# Patient Record
Sex: FEMALE | Race: WHITE | NOT HISPANIC OR LATINO | Employment: FULL TIME | URBAN - METROPOLITAN AREA
[De-identification: names, ages, dates, MRNs, and addresses within clinical notes are randomized per-mention and may not be internally consistent; named-entity substitution may affect disease eponyms.]

---

## 2018-09-26 ENCOUNTER — APPOINTMENT (OUTPATIENT)
Dept: WOMENS IMAGING | Facility: HOSPITAL | Age: 53
End: 2018-09-26

## 2018-09-26 PROCEDURE — 77067 SCR MAMMO BI INCL CAD: CPT | Performed by: RADIOLOGY

## 2020-12-09 ENCOUNTER — APPOINTMENT (OUTPATIENT)
Dept: WOMENS IMAGING | Facility: HOSPITAL | Age: 55
End: 2020-12-09

## 2020-12-09 PROCEDURE — 77067 SCR MAMMO BI INCL CAD: CPT | Performed by: RADIOLOGY

## 2021-03-24 ENCOUNTER — OFFICE VISIT (OUTPATIENT)
Dept: CARDIOLOGY | Facility: CLINIC | Age: 56
End: 2021-03-24

## 2021-03-24 VITALS
OXYGEN SATURATION: 98 % | SYSTOLIC BLOOD PRESSURE: 128 MMHG | RESPIRATION RATE: 12 BRPM | BODY MASS INDEX: 30.55 KG/M2 | HEART RATE: 84 BPM | HEIGHT: 62 IN | DIASTOLIC BLOOD PRESSURE: 74 MMHG | WEIGHT: 166 LBS | TEMPERATURE: 97.8 F

## 2021-03-24 DIAGNOSIS — I73.9 PVD (PERIPHERAL VASCULAR DISEASE) WITH CLAUDICATION (HCC): ICD-10-CM

## 2021-03-24 DIAGNOSIS — R06.02 SHORTNESS OF BREATH: Primary | ICD-10-CM

## 2021-03-24 PROCEDURE — 93000 ELECTROCARDIOGRAM COMPLETE: CPT | Performed by: INTERNAL MEDICINE

## 2021-03-24 PROCEDURE — 99204 OFFICE O/P NEW MOD 45 MIN: CPT | Performed by: INTERNAL MEDICINE

## 2021-03-24 NOTE — PROGRESS NOTES
[facility] [place]  [facility] CARDIOLOGY  [address] [place] [address]  Dept: [phone]  Dept Fax: [phone]    Kalli Lemons  1965    New Patient Office Note    History of Present Illness:  Kalli Lemons is a 56 y.o. female who presents to the clinic for new patient. She is smoker, wants to be proactive and wants to know if she might have heart problems, her cousin just  from [place], this week, she does have some SOB on walking and she thinks is related to smoking, also feels tingling and numbness on her legs and arms, her BP is 125.80, her Lipids are elevated, LDl is 145. , her cardiac exam seems normal, EKG sinus rhythm normal EKG , will get a Plain treadmill and also an echo plus an SVETLANA,     The following portions of the patient's history were reviewed and updated as appropriate: allergies, current medications, past family history, past medical history, past social history, past surgical history and problem list.    Medications:  vitamin B-12  Vitamin D3 tablet    Subjective  No Known Allergies     Past Medical History:   Diagnosis Date   • Acute non-recurrent frontal sinusitis    • Anxiety    • BMI 30.0-30.9,adult    • Cigarette nicotine dependence with nicotine-induced disorder    • Cough    • Depression    • Extremity numbness    • Fatigue    • Irritable bowel syndrome with diarrhea    • Polyuria        Past Surgical History:   Procedure Laterality Date   • CHOLECYSTECTOMY     • HYSTERECTOMY         Family History   Problem Relation Age of Onset   • Cancer Brother         Social History     Socioeconomic History   • Marital status: Unknown     Spouse name: Not on file   • Number of children: Not on file   • Years of education: Not on file   • Highest education level: Not on file   Tobacco Use   • Smoking status: Current Some Day Smoker     Packs/day: 1.00     Types: Cigarettes     Start date:    • Smokeless tobacco: Never Used   Substance and Sexual Activity   •  "Alcohol use: Never   • Drug use: Yes     Types: Marijuana   • Sexual activity: Defer       Review of Systems   Constitutional: Negative.    HENT: Negative.    Respiratory: Positive for shortness of breath.    Cardiovascular: Negative.    Endocrine: Negative.    Genitourinary: Negative.    Musculoskeletal: Negative.    Skin: Negative.    Allergic/Immunologic: Negative.    Neurological: Negative.    Hematological: Negative.    Psychiatric/Behavioral: Negative.        Cardiovascular Procedures    ECHO/MUGA:   STRESS TESTS:   CARDIAC CATH:   DEVICES:   HOLTER:   CT/MRI:   VASCULAR:   CARDIOTHORACIC:     Objective  Vitals:    03/24/21 1523   BP: 128/74   BP Location: Left arm   Patient Position: Sitting   Cuff Size: Adult   Pulse: 84   Resp: 12   Temp: 97.8 °F (36.6 °C)   TempSrc: Infrared   SpO2: 98%   Weight: 75.3 kg (166 lb)   Height: 157.5 cm (62\")   PainSc: 0-No pain       Physical Exam  Constitutional:       Appearance: Healthy appearance. Not in distress.   Neck:      Vascular: No JVR. JVD normal.   Pulmonary:      Effort: Pulmonary effort is normal.      Breath sounds: Normal breath sounds. No wheezing. No rhonchi. No rales.   Chest:      Chest wall: Not tender to palpatation.   Cardiovascular:      PMI at left midclavicular line. Normal rate. Regular rhythm. Normal S1. Normal S2.      Murmurs: There is no murmur.      No gallop. No click. No rub.   Pulses:     Intact distal pulses.   Edema:     Peripheral edema absent.   Abdominal:      General: Bowel sounds are normal.      Palpations: Abdomen is soft.      Tenderness: There is no abdominal tenderness.   Musculoskeletal: Normal range of motion.         General: No tenderness. Skin:     General: Skin is warm and dry.   Neurological:      General: No focal deficit present.      Mental Status: Alert and oriented to person, place and time.          Diagnostic Data    ECG 12 Lead    Date/Time: 3/24/2021 3:58 PM  Performed by: Haseeb Graves MD  Authorized " by: Haseeb Graves MD   Comparison: not compared with previous ECG   Rhythm: sinus rhythm  Rate: normal  BPM: 79  QRS axis: normal    Clinical impression: normal ECG            Assessment and Plan  Shortness of breath-She has sob for a while ., mostly on walking or  doing her chores, she is smoker, will get a treadmill EKG and also an echo  Claudication- He numbness on her legs but also on her hands, , will get an SVETLANA    No follow-ups on file.    Haseeb Graves MD  03/24/2021

## 2021-08-25 ENCOUNTER — OFFICE VISIT (OUTPATIENT)
Dept: NEUROLOGY | Facility: CLINIC | Age: 56
End: 2021-08-25

## 2021-08-25 ENCOUNTER — LAB (OUTPATIENT)
Dept: LAB | Facility: HOSPITAL | Age: 56
End: 2021-08-25

## 2021-08-25 VITALS
HEIGHT: 62 IN | HEART RATE: 89 BPM | DIASTOLIC BLOOD PRESSURE: 80 MMHG | BODY MASS INDEX: 28.52 KG/M2 | OXYGEN SATURATION: 97 % | SYSTOLIC BLOOD PRESSURE: 130 MMHG | WEIGHT: 155 LBS

## 2021-08-25 DIAGNOSIS — R41.3 MEMORY LOSS: Primary | ICD-10-CM

## 2021-08-25 LAB
AMMONIA BLD-SCNC: 21 UMOL/L (ref 11–51)
FOLATE SERPL-MCNC: >20 NG/ML (ref 4.78–24.2)
HIV1+2 AB SER QL: NORMAL
VIT B12 BLD-MCNC: 519 PG/ML (ref 211–946)

## 2021-08-25 PROCEDURE — 86592 SYPHILIS TEST NON-TREP QUAL: CPT | Performed by: PHYSICIAN ASSISTANT

## 2021-08-25 PROCEDURE — 82140 ASSAY OF AMMONIA: CPT | Performed by: PHYSICIAN ASSISTANT

## 2021-08-25 PROCEDURE — G0432 EIA HIV-1/HIV-2 SCREEN: HCPCS | Performed by: PHYSICIAN ASSISTANT

## 2021-08-25 PROCEDURE — 82607 VITAMIN B-12: CPT | Performed by: PHYSICIAN ASSISTANT

## 2021-08-25 PROCEDURE — 36415 COLL VENOUS BLD VENIPUNCTURE: CPT | Performed by: PHYSICIAN ASSISTANT

## 2021-08-25 PROCEDURE — 99205 OFFICE O/P NEW HI 60 MIN: CPT | Performed by: PHYSICIAN ASSISTANT

## 2021-08-25 PROCEDURE — 82746 ASSAY OF FOLIC ACID SERUM: CPT | Performed by: PHYSICIAN ASSISTANT

## 2021-08-25 RX ORDER — FLUOXETINE HYDROCHLORIDE 20 MG/1
CAPSULE ORAL
COMMUNITY
Start: 2021-08-09 | End: 2021-08-25

## 2021-08-25 RX ORDER — LAMOTRIGINE 25 MG/1
TABLET ORAL
COMMUNITY
Start: 2021-08-03 | End: 2022-06-30

## 2021-08-25 RX ORDER — HYDROXYZINE HYDROCHLORIDE 25 MG/1
TABLET, FILM COATED ORAL
COMMUNITY
Start: 2021-08-09 | End: 2022-06-30

## 2021-08-25 RX ORDER — ARIPIPRAZOLE 5 MG/1
TABLET ORAL
COMMUNITY
Start: 2021-07-28 | End: 2022-06-30

## 2021-08-25 NOTE — PROGRESS NOTES
"Subjective       Chief Complaint: memory loss      History of Present Illness   Kalli Lemons is a 56 y.o. female who comes to clinic today for evaluation of memory loss . She has noted mild forgetfulness and word finding difficulties for years. She feels that this worsened several weeks after starting fluoxetine in 5/21. For example, she states that she was told that she became agitated while talking with someone regarding her upcoming half-way, but does not remember the conversation at all. Additional symptoms have included impairments in concentration. There have been associated symptoms of anxiety, for which she is following with behavioral health. She denies  impairments in ADL's. She manages her medications  and finances. She currently works for [employer] and denies any issues performing her current job duties. She continues to drive.   She is currently residing with family.     Prior evaluation has included screening blood work  through her PCP, though we do not currently have these results.     She reports that her mother and maternal grandmother had a history of dementia, developing symptoms in their 70's. Additionally, several other family members on her mother's side may have had cognitive disorders.       I have reviewed and confirmed the past family, social and medical history as accurate on 8/25/21.     Review of Systems   Constitutional: Negative.    HENT: Negative.    Eyes: Negative.    Respiratory: Negative.    Cardiovascular: Negative.    Gastrointestinal: Negative.    Endocrine: Negative.    Genitourinary: Negative.    Musculoskeletal: Negative.    Skin: Negative.    Allergic/Immunologic: Negative.    Neurological:        Memory loss    Hematological: Negative.        Objective     /80   Pulse 89   Ht 157.5 cm (62\")   Wt 70.3 kg (155 lb)   SpO2 97%   BMI 28.35 kg/m²     General appearance today is normal.   Peripheral pulses were present and symmetric.  The " ophthalmoscopic exam today is unremarkable. The discs and posterior elements are unremarkable.      Physical Exam  Neurological:      Mental Status: She is oriented to person, place, and time.      Coordination: Finger-Nose-Finger Test and Heel to Shin Test normal.      Gait: Gait is intact.      Deep Tendon Reflexes: Strength normal.      Reflex Scores:       Patellar reflexes are 2+ on the right side and 2+ on the left side.  Psychiatric:         Speech: Speech normal.          Neurologic Exam     Mental Status   Oriented to person, place, and time.   Registration: recalls 3 of 3 objects. Recall at 5 minutes: recalls 3 of 3 objects. Follows 3 step commands.   Attention: normal.   Speech: speech is normal   Level of consciousness: alert  Able to name object. Able to read. Able to repeat. Able to write. Normal comprehension.     Cranial Nerves   Cranial nerves II through XII intact.     Motor Exam   Muscle bulk: normal  Overall muscle tone: normal    Strength   Strength 5/5 throughout.     Sensory Exam   Light touch normal.     Gait, Coordination, and Reflexes     Gait  Gait: normal    Coordination   Finger to nose coordination: normal  Heel to shin coordination: normal    Tremor   Resting tremor: absent    Reflexes   Right patellar: 2+  Left patellar: 2+        Results  MMSE=30  MoCA= 29 (4/5 visuo spatial, 5/5 free recall)        Assessment/Plan   Diagnoses and all orders for this visit:    1. Memory loss (Primary)  -     Folate  -     Vitamin B12  -     MRI Brain Without Contrast  -     HIV-1 / O / 2 Ag / Antibody 4th Generation  -     RPR  -     Ammonia          Discussion/Summary   Kalli Lemons comes to clinic today for evaluation of memory loss . Her neurologic examination today, including cognitive bedside testing is reassuring. I am concerned that her history of anxiety may be negatively affecting her cognition. This was discussed in detail. It was elected to obtain screening blood work  and an MRI of the  brain . She will then follow up in 1 year, or sooner if needed.   Total time of visit today: 60 minutes. As part of this visit I reviewed outside records. I also discussed diagnosis, prognosis, diagnostic testing, evaluation, current status, treatment options and management as discussed above.           Pia Talley PA-C

## 2021-08-26 LAB — RPR SER QL: NORMAL

## 2021-09-16 ENCOUNTER — HOSPITAL ENCOUNTER (OUTPATIENT)
Dept: MRI IMAGING | Facility: HOSPITAL | Age: 56
Discharge: HOME | End: 2021-09-16
Admitting: PHYSICIAN ASSISTANT

## 2021-09-16 PROCEDURE — 70551 MRI BRAIN STEM W/O DYE: CPT

## 2022-06-30 ENCOUNTER — OFFICE VISIT (OUTPATIENT)
Dept: FAMILY MEDICINE CLINIC | Facility: CLINIC | Age: 57
End: 2022-06-30

## 2022-06-30 VITALS
BODY MASS INDEX: 27.97 KG/M2 | HEIGHT: 62 IN | DIASTOLIC BLOOD PRESSURE: 78 MMHG | SYSTOLIC BLOOD PRESSURE: 122 MMHG | OXYGEN SATURATION: 98 % | WEIGHT: 152 LBS | HEART RATE: 78 BPM

## 2022-06-30 DIAGNOSIS — R73.03 PREDIABETES: ICD-10-CM

## 2022-06-30 DIAGNOSIS — E78.2 MIXED HYPERLIPIDEMIA: ICD-10-CM

## 2022-06-30 DIAGNOSIS — Z00.00 ANNUAL PHYSICAL EXAM: Primary | ICD-10-CM

## 2022-06-30 DIAGNOSIS — Z72.0 TOBACCO USE: ICD-10-CM

## 2022-06-30 DIAGNOSIS — E56.9 VITAMIN DEFICIENCY: ICD-10-CM

## 2022-06-30 PROCEDURE — 99386 PREV VISIT NEW AGE 40-64: CPT | Performed by: NURSE PRACTITIONER

## 2022-06-30 PROCEDURE — 36415 COLL VENOUS BLD VENIPUNCTURE: CPT | Performed by: NURSE PRACTITIONER

## 2022-06-30 NOTE — PATIENT INSTRUCTIONS
Obesity, Adult  Obesity is the condition of having too much total body fat. Being overweight or obese means that your weight is greater than what is considered healthy for your body size. Obesity is determined by a measurement called BMI. BMI is an estimate of body fat and is calculated from height and weight. For adults, a BMI of 30 or higher is considered obese.  Obesity can lead to other health concerns and major illnesses, including:  · Stroke.  · Coronary artery disease (CAD).  · Type 2 diabetes.  · Some types of cancer, including cancers of the colon, breast, uterus, and gallbladder.  · Osteoarthritis.  · High blood pressure (hypertension).  · High cholesterol.  · Sleep apnea.  · Gallbladder stones.  · Infertility problems.  What are the causes?  Common causes of this condition include:  · Eating daily meals that are high in calories, sugar, and fat.  · Being born with genes that may make you more likely to become obese.  · Having a medical condition that causes obesity, including:  ? Hypothyroidism.  ? Polycystic ovarian syndrome (PCOS).  ? Binge-eating disorder.  ? Cushing syndrome.  · Taking certain medicines, such as steroids, antidepressants, and seizure medicines.  · Not being physically active (sedentary lifestyle).  · Not getting enough sleep.  · Drinking high amounts of sugar-sweetened beverages, such as soft drinks.  What increases the risk?  The following factors may make you more likely to develop this condition:  · Having a family history of obesity.  · Being a woman of  descent.  · Being a man of  descent.  · Living in an area with limited access to:  ? [place], recreation centers, or sidewalks.  ? Healthy food choices, such as grocery stores and farmers' markets.  What are the signs or symptoms?  The main sign of this condition is having too much body fat.  How is this diagnosed?  This condition is diagnosed based on:  · Your BMI. If you are an adult with a BMI of 30 or  higher, you are considered obese.  · Your waist circumference. This measures the distance around your waistline.  · Your skinfold thickness. Your health care provider may gently pinch a fold of your skin and measure it.  You may have other tests to check for underlying conditions.  How is this treated?  Treatment for this condition often includes changing your lifestyle. Treatment may include some or all of the following:  · Dietary changes. This may include developing a healthy meal plan.  · Regular physical activity. This may include activity that causes your heart to beat faster (aerobic exercise) and strength training. Work with your health care provider to design an exercise program that works for you.  · Medicine to help you lose weight if you are unable to lose 1 pound a week after 6 weeks of healthy eating and more physical activity.  · Treating conditions that cause the obesity (underlying conditions).  · Surgery. Surgical options may include gastric banding and gastric bypass. Surgery may be done if:  ? Other treatments have not helped to improve your condition.  ? You have a BMI of 40 or higher.  ? You have life-threatening health problems related to obesity.  Follow these instructions at home:  Eating and drinking    · Follow recommendations from your health care provider about what you eat and drink. Your health care provider may advise you to:  ? Limit fast food, sweets, and processed snack foods.  ? Choose low-fat options, such as low-fat milk instead of whole milk.  ? Eat 5 or more servings of fruits or vegetables every day.  ? Eat at home more often. This gives you more control over what you eat.  ? Choose healthy foods when you eat out.  ? Learn to read food labels. This will help you understand how much food is considered 1 serving.  ? Learn what a healthy serving size is.  ? Keep low-fat snacks available.  ? Limit sugary drinks, such as soda, fruit juice, sweetened iced tea, and flavored  milk.  · Drink enough water to keep your urine pale yellow.  · Do not follow a fad diet. Fad diets can be unhealthy and even dangerous.    Physical activity  · Exercise regularly, as told by your health care provider.  ? Most adults should get up to 150 minutes of moderate-intensity exercise every week.  ? Ask your health care provider what types of exercise are safe for you and how often you should exercise.  · Warm up and stretch before being active.  · Cool down and stretch after being active.  · Rest between periods of activity.  Lifestyle  · Work with your health care provider and a dietitian to set a weight-loss goal that is healthy and reasonable for you.  · Limit your screen time.  · Find ways to reward yourself that do not involve food.  · Do not drink alcohol if:  ? Your health care provider tells you not to drink.  ? You are pregnant, may be pregnant, or are planning to become pregnant.  · If you drink alcohol:  ? Limit how much you use to:  § 0-1 drink a day for women.  § 0-2 drinks a day for men.  ? Be aware of how much alcohol is in your drink. In the [place] one drink equals one 12 oz bottle of beer (355 mL), one 5 oz glass of wine (148 mL), or one 1½ oz glass of hard liquor (44 mL).  General instructions  · Keep a weight-loss journal to keep track of the food you eat and how much exercise you get.  · Take over-the-counter and prescription medicines only as told by your health care provider.  · Take vitamins and supplements only as told by your health care provider.  · Consider joining a support group. Your health care provider may be able to recommend a support group.  · Keep all follow-up visits as told by your health care provider. This is important.  Contact a health care provider if:  · You are unable to meet your weight loss goal after 6 weeks of dietary and lifestyle changes.  Get help right away if you are having:  · Trouble breathing.  · Suicidal thoughts or behaviors.  Summary  · Obesity is  the condition of having too much total body fat.  · Being overweight or obese means that your weight is greater than what is considered healthy for your body size.  · Work with your health care provider and a dietitian to set a weight-loss goal that is healthy and reasonable for you.  · Exercise regularly, as told by your health care provider. Ask your health care provider what types of exercise are safe for you and how often you should exercise.  This information is not intended to replace advice given to you by your health care provider. Make sure you discuss any questions you have with your health care provider.  Document Revised: 08/22/2019 Document Reviewed: 08/22/2019  [employer] Patient Education © 2021 [employer]      Exercising to Lose Weight  Exercise is structured, repetitive physical activity to improve fitness and health. Getting regular exercise is important for everyone. It is especially important if you are overweight. Being overweight increases your risk of heart disease, stroke, diabetes, high blood pressure, and several types of cancer. Reducing your calorie intake and exercising can help you lose weight.  Exercise is usually categorized as moderate or vigorous intensity. To lose weight, most people need to do a certain amount of moderate-intensity or vigorous-intensity exercise each week.  Moderate-intensity exercise    Moderate-intensity exercise is any activity that gets you moving enough to burn at least three times more energy (calories) than if you were sitting.  Examples of moderate exercise include:  · Walking a mile in 15 minutes.  · Doing light yard work.  · Biking at an easy pace.  Most people should get at least 150 minutes (2 hours and 30 minutes) a week of moderate-intensity exercise to maintain their body weight.  Vigorous-intensity exercise  Vigorous-intensity exercise is any activity that gets you moving enough to burn at least six times more calories than if you were sitting.  When you exercise at this intensity, you should be working hard enough that you are not able to carry on a conversation.  Examples of vigorous exercise include:  · Running.  · Playing a team sport, such as football, basketball, and soccer.  · Jumping rope.  Most people should get at least 75 minutes (1 hour and 15 minutes) a week of vigorous-intensity exercise to maintain their body weight.  How can exercise affect me?  When you exercise enough to burn more calories than you eat, you lose weight. Exercise also reduces body fat and builds muscle. The more muscle you have, the more calories you burn. Exercise also:  · Improves mood.  · Reduces stress and tension.  · Improves your overall fitness, flexibility, and endurance.  · Increases bone strength.  The amount of exercise you need to lose weight depends on:  · Your age.  · The type of exercise.  · Any health conditions you have.  · Your overall physical ability.  Talk to your health care provider about how much exercise you need and what types of activities are safe for you.  What actions can I take to lose weight?  Nutrition    · Make changes to your diet as told by your health care provider or diet and nutrition specialist (dietitian). This may include:  ? Eating fewer calories.  ? Eating more protein.  ? Eating less unhealthy fats.  ? Eating a diet that includes fresh fruits and vegetables, whole grains, low-fat dairy products, and lean protein.  ? Avoiding foods with added fat, salt, and sugar.  · Drink plenty of water while you exercise to prevent dehydration or heat stroke.    Activity  · Choose an activity that you enjoy and set realistic goals. Your health care provider can help you make an exercise plan that works for you.  · Exercise at a moderate or vigorous intensity most days of the week.  ? The intensity of exercise may vary from person to person. You can tell how intense a workout is for you by paying attention to your breathing and heartbeat. Most  people will notice their breathing and heartbeat get faster with more intense exercise.  · Do resistance training twice each week, such as:  ? Push-ups.  ? Sit-ups.  ? Lifting weights.  ? Using resistance bands.  · Getting short amounts of exercise can be just as helpful as long structured periods of exercise. If you have trouble finding time to exercise, try to include exercise in your daily routine.  ? Get up, stretch, and walk around every 30 minutes throughout the day.  ? Go for a walk during your lunch break.  ? Park your car farther away from your destination.  ? If you take public transportation, get off one stop early and walk the rest of the way.  ? Make phone calls while standing up and walking around.  ? Take the stairs instead of elevators or escalators.  · Wear comfortable clothes and shoes with good support.  · Do not exercise so much that you hurt yourself, feel dizzy, or get very short of breath.  Where to find more information  · [place] Department of Health and Human Services: www.hhs.gov  · Centers for Disease Control and Prevention (CDC): www.cdc.gov  Contact a health care provider:  · Before starting a new exercise program.  · If you have questions or concerns about your weight.  · If you have a medical problem that keeps you from exercising.  Get help right away if you have any of the following while exercising:  · Injury.  · Dizziness.  · Difficulty breathing or shortness of breath that does not go away when you stop exercising.  · Chest pain.  · Rapid heartbeat.  Summary  · Being overweight increases your risk of heart disease, stroke, diabetes, high blood pressure, and several types of cancer.  · Losing weight happens when you burn more calories than you eat.  · Reducing the amount of calories you eat in addition to getting regular moderate or vigorous exercise each week helps you lose weight.  This information is not intended to replace advice given to you by your health care provider. Make  sure you discuss any questions you have with your health care provider.  Document Revised: 04/15/2021 Document Reviewed: 04/15/2021  [employer] Patient Education © 2021 [employer]      Steps to Quit Smoking  Smoking tobacco is the leading cause of preventable death. It can affect almost every organ in the body. Smoking puts you and those around you at risk for developing many serious chronic diseases. Quitting smoking can be difficult, but it is one of the best things that you can do for your health. It is never too late to quit.  How do I get ready to quit?  When you decide to quit smoking, create a plan to help you succeed. Before you quit:  · Pick a date to quit. Set a date within the next 2 weeks to give you time to prepare.  · Write down the reasons why you are quitting. Keep this list in places where you will see it often.  · Tell your family, friends, and co-workers that you are quitting. Support from your loved ones can make quitting easier.  · Talk with your health care provider about your options for quitting smoking.  · Find out what treatment options are covered by your health insurance.  · Identify people, places, things, and activities that make you want to smoke (triggers). Avoid them.  What first steps can I take to quit smoking?  · Throw away all cigarettes at home, at work, and in your car.  · Throw away smoking accessories, such as ashtrays and lighters.  · Clean your car. Make sure to empty the ashtray.  · Clean your home, including curtains and carpets.  What strategies can I use to quit smoking?  Talk with your health care provider about combining strategies, such as taking medicines while you are also receiving in-person counseling. Using these two strategies together makes you more likely to succeed in quitting than if you used either strategy on its own.  · If you are pregnant or breastfeeding, talk with your health care provider about finding counseling or other support strategies to quit  smoking. Do not take medicine to help you quit smoking unless your health care provider tells you to do so.  To quit smoking:  Quit right away  · Quit smoking completely, instead of gradually reducing how much you smoke over a period of time. Research shows that stopping smoking right away is more successful than gradually quitting.  · Attend in-person counseling to help you build problem-solving skills. You are more likely to succeed in quitting if you attend counseling sessions regularly. Even short sessions of 10 minutes can be effective.  Take medicine  You may take medicines to help you quit smoking. Some medicines require a prescription and some you can purchase over-the-counter. Medicines may have nicotine in them to replace the nicotine in cigarettes. Medicines may:  · Help to stop cravings.  · Help to relieve withdrawal symptoms.  Your health care provider may recommend:  · Nicotine patches, gum, or lozenges.  · Nicotine inhalers or sprays.  · Non-nicotine medicine that is taken by mouth.  Find resources  Find resources and support systems that can help you to quit smoking and remain smoke-free after you quit. These resources are most helpful when you use them often. They include:  · Online chats with a counselor.  · Telephone quitlines.  · Printed self-help materials.  · Support groups or group counseling.  · Text messaging programs.  · Mobile phone apps or applications. Use apps that can help you stick to your quit plan by providing reminders, tips, and encouragement. There are many free apps for mobile devices as well as websites. Examples include Quit Guide from the CDC and smokefree.gov  What things can I do to make it easier to quit?    · Reach out to your family and friends for support and encouragement. Call telephone quitlines (9-800-QUIT-NOW), reach out to support groups, or work with a counselor for support.  · Ask people who smoke to avoid smoking around you.  · Avoid places that trigger you to  smoke, such as bars, parties, or smoke-break areas at work.  · Spend time with people who do not smoke.  · Lessen the stress in your life. Stress can be a smoking trigger for some people. To lessen stress, try:  ? Exercising regularly.  ? Doing deep-breathing exercises.  ? Doing yoga.  ? Meditating.  ? Performing a body scan. This involves closing your eyes, scanning your body from head to toe, and noticing which parts of your body are particularly tense. Try to relax the muscles in those areas.  How will I feel when I quit smoking?  Day 1 to 3 weeks  Within the first 24 hours of quitting smoking, you may start to feel withdrawal symptoms. These symptoms are usually most noticeable 2-3 days after quitting, but they usually do not last for more than 2-3 weeks. You may experience these symptoms:  · Mood swings.  · Restlessness, anxiety, or irritability.  · Trouble concentrating.  · Dizziness.  · Strong cravings for sugary foods and nicotine.  · Mild weight gain.  · Constipation.  · Nausea.  · Coughing or a sore throat.  · Changes in how the medicines that you take for unrelated issues work in your body.  · Depression.  · Trouble sleeping (insomnia).  Week 3 and afterward  After the first 2-3 weeks of quitting, you may start to notice more positive results, such as:  · Improved sense of smell and taste.  · Decreased coughing and sore throat.  · Slower heart rate.  · Lower blood pressure.  · Clearer skin.  · The ability to breathe more easily.  · Fewer sick days.  Quitting smoking can be very challenging. Do not get discouraged if you are not successful the first time. Some people need to make many attempts to quit before they achieve long-term success. Do your best to stick to your quit plan, and talk with your health care provider if you have any questions or concerns.  Summary  · Smoking tobacco is the leading cause of preventable death. Quitting smoking is one of the best things that you can do for your  health.  · When you decide to quit smoking, create a plan to help you succeed.  · Quit smoking right away, not slowly over a period of time.  · When you start quitting, seek help from your health care provider, family, or friends.  This information is not intended to replace advice given to you by your health care provider. Make sure you discuss any questions you have with your health care provider.  Document Revised: 09/11/2020 Document Reviewed: 03/07/2020  [employer] Patient Education © 2021 [employer]

## 2022-06-30 NOTE — PROGRESS NOTES
"Chief Complaint  Annual Exam    Subjective          Kalli Lemons presents to [facility] for preventative yearly exam.   History of Present Illness     Presents for annual exam.  Is fasting.  Continues to smoke but is down to half a pack a day.  States ideally she would like to quit completely in the future.  Tries to watch her diet.  States she does stay active and is also doing a lot of yard work.  Takes no medications.  No dizziness no headache no chest pain no chest pressure no shortness of breath no trouble breathing no urinary or bowel issues.    In the past has had elevated white blood cell counts.  States she followed up with oncology regarding this and they told her it is due to her smoking and not of a concern.    Colonoscopy up-to-date.    Low-dose lung CT scan up-to-date.    States she had a mammogram in September 26, 2021.  Suzie will try to get those records.  States she is up-to-date and she usually schedules her own mammograms.    Objective   Vital Signs:   /78   Pulse 78   Ht 157.5 cm (62\")   Wt 68.9 kg (152 lb)   SpO2 98%   BMI 27.80 kg/m²     Body mass index is 27.8 kg/m².    Predictive Model Details   No score data available for Risk of Fall        PHQ-9 Depression Screening  Little interest or pleasure in doing things? 0-->not at all   Feeling down, depressed, or hopeless? 0-->not at all   Trouble falling or staying asleep, or sleeping too much?     Feeling tired or having little energy?     Poor appetite or overeating?     Feeling bad about yourself - or that you are a failure or have let yourself or your family down?     Trouble concentrating on things, such as reading the newspaper or watching television?     Moving or speaking so slowly that other people could have noticed? Or the opposite - being so fidgety or restless that you have been moving around a lot more than usual?     Thoughts that you would be better off dead, or of hurting yourself " in some way?     PHQ-9 Total Score 0   If you checked off any problems, how difficult have these problems made it for you to do your work, take care of things at home, or get along with other people?                There is no immunization history on file for this patient.    Review of Systems   Constitutional: Negative for chills, fatigue, fever, unexpected weight gain and unexpected weight loss.   HENT: Negative for congestion.    Eyes: Negative.    Respiratory: Negative.    Cardiovascular: Negative.    Gastrointestinal: Negative.    Genitourinary: Negative for decreased urine volume, dysuria, frequency, hematuria and urgency.   Musculoskeletal: Negative for arthralgias and back pain.   Skin: Negative for rash and wound.   Neurological: Negative.    Psychiatric/Behavioral: Negative.        Past History:  Medical History: has a past medical history of Acute non-recurrent frontal sinusitis, Anxiety, BMI 30.0-30.9,adult, Cigarette nicotine dependence with nicotine-induced disorder, Cough, Depression, Extremity numbness, Fatigue, Irritable bowel syndrome with diarrhea, and Polyuria.   Surgical History: has a past surgical history that includes Hysterectomy and Cholecystectomy.   Family History: family history includes Cancer in her brother.   Social History: reports that she has been smoking cigarettes. She started smoking about 34 years ago. She has been smoking about 1.00 pack per day. She has never used smokeless tobacco. She reports current drug use. Drug: Marijuana. She reports that she does not drink alcohol.      Current Outpatient Medications:   •  Cholecalciferol (Vitamin D3) 50 MCG (2000 UT) tablet, Take 2 tablets by mouth Daily., Disp: , Rfl:   •  vitamin B-12 (CYANOCOBALAMIN) 1000 MCG tablet, Take 1,000 mcg by mouth Daily., Disp: , Rfl:    Allergies: Patient has no known allergies.    Physical Exam  Constitutional:       Appearance: Normal appearance.   HENT:      Head: Normocephalic.      Right Ear:  Tympanic membrane, ear canal and external ear normal.      Left Ear: Tympanic membrane, ear canal and external ear normal.      Nose: Nose normal.      Mouth/Throat:      Mouth: Mucous membranes are moist.      Pharynx: Oropharynx is clear.   Eyes:      Extraocular Movements: Extraocular movements intact.      Conjunctiva/sclera: Conjunctivae normal.      Pupils: Pupils are equal, round, and reactive to light.   Cardiovascular:      Rate and Rhythm: Normal rate and regular rhythm.      Heart sounds: Normal heart sounds.   Pulmonary:      Effort: Pulmonary effort is normal.      Breath sounds: Normal breath sounds.   Abdominal:      General: Abdomen is flat. Bowel sounds are normal.      Palpations: Abdomen is soft.   Musculoskeletal:         General: Normal range of motion.      Cervical back: Normal range of motion.   Skin:     General: Skin is warm.   Neurological:      General: No focal deficit present.      Mental Status: She is alert and oriented to person, place, and time. Mental status is at baseline.   Psychiatric:         Mood and Affect: Mood normal.         Behavior: Behavior normal.         Thought Content: Thought content normal.         Judgment: Judgment normal.          Result Review :                     Assessment and Plan    Diagnoses and all orders for this visit:    1. Annual physical exam (Primary)  Assessment & Plan:  Fasting labs drawn.  UA completed.  Suzie will try to get past mammogram report.  We discussed all immunizations such as shingles pneumonia tetanus and COVID vaccines and she states she will discuss with her pharmacist.  Proper diet and exercise plan discussed and encouraged.  States she has had a total hysterectomy and that she does not do Pap smears anymore.  Annual dental exams encouraged.  Education provided.  Return to clinic or ED with any issues or concerns.    Orders:  -     CBC & Differential; Future  -     Comprehensive Metabolic Panel; Future  -     TSH Rfx On  Abnormal To Free T4; Future  -     POC Urinalysis Dipstick, Automated; Future    2. Vitamin deficiency  -     Vitamin B12; Future  -     Vitamin D 25 Hydroxy; Future    3. Mixed hyperlipidemia  -     Lipid Panel; Future    4. Prediabetes  -     Hemoglobin A1c; Future    5. Tobacco use            BMI is >= 25 and <30. (Overweight) The following options were offered after discussion;: exercise counseling/recommendations and nutrition counseling/recommendations       Follow Up   Return in about 1 year (around 6/30/2023).  Patient was given instructions and counseling regarding her condition or for health maintenance advice. Please see specific information pulled into the AVS if appropriate.     PEGGY Wilkins

## 2022-06-30 NOTE — ASSESSMENT & PLAN NOTE
Fasting labs drawn.  UA completed.  Suzie will try to get past mammogram report.  We discussed all immunizations such as shingles pneumonia tetanus and COVID vaccines and she states she will discuss with her pharmacist.  Proper diet and exercise plan discussed and encouraged.  States she has had a total hysterectomy and that she does not do Pap smears anymore.  Annual dental exams encouraged.  Education provided.  Return to clinic or ED with any issues or concerns.

## 2022-07-01 LAB
25(OH)D3+25(OH)D2 SERPL-MCNC: 31.9 NG/ML (ref 30–100)
ALBUMIN SERPL-MCNC: 4.7 G/DL (ref 3.8–4.9)
ALBUMIN/GLOB SERPL: 2 {RATIO} (ref 1.2–2.2)
ALP SERPL-CCNC: 71 IU/L (ref 44–121)
ALT SERPL-CCNC: 12 IU/L (ref 0–32)
AST SERPL-CCNC: 12 IU/L (ref 0–40)
BASOPHILS # BLD AUTO: 0.1 X10E3/UL (ref 0–0.2)
BASOPHILS NFR BLD AUTO: 1 %
BILIRUB SERPL-MCNC: 0.4 MG/DL (ref 0–1.2)
BUN SERPL-MCNC: 10 MG/DL (ref 6–24)
BUN/CREAT SERPL: 15 (ref 9–23)
CALCIUM SERPL-MCNC: 9.2 MG/DL (ref 8.7–10.2)
CHLORIDE SERPL-SCNC: 104 MMOL/L (ref 96–106)
CHOLEST SERPL-MCNC: 201 MG/DL (ref 100–199)
CO2 SERPL-SCNC: 22 MMOL/L (ref 20–29)
CREAT SERPL-MCNC: 0.65 MG/DL (ref 0.57–1)
EGFRCR SERPLBLD CKD-EPI 2021: 102 ML/MIN/1.73
EOSINOPHIL # BLD AUTO: 0.1 X10E3/UL (ref 0–0.4)
EOSINOPHIL NFR BLD AUTO: 1 %
ERYTHROCYTE [DISTWIDTH] IN BLOOD BY AUTOMATED COUNT: 13.1 % (ref 11.7–15.4)
GLOBULIN SER CALC-MCNC: 2.3 G/DL (ref 1.5–4.5)
GLUCOSE SERPL-MCNC: 87 MG/DL (ref 65–99)
HBA1C MFR BLD: 5.6 % (ref 4.8–5.6)
HCT VFR BLD AUTO: 45.2 % (ref 34–46.6)
HDLC SERPL-MCNC: 52 MG/DL
HGB BLD-MCNC: 15.2 G/DL (ref 11.1–15.9)
IMM GRANULOCYTES # BLD AUTO: 0 X10E3/UL (ref 0–0.1)
IMM GRANULOCYTES NFR BLD AUTO: 0 %
LDLC SERPL CALC-MCNC: 130 MG/DL (ref 0–99)
LYMPHOCYTES # BLD AUTO: 3.4 X10E3/UL (ref 0.7–3.1)
LYMPHOCYTES NFR BLD AUTO: 25 %
MCH RBC QN AUTO: 30.5 PG (ref 26.6–33)
MCHC RBC AUTO-ENTMCNC: 33.6 G/DL (ref 31.5–35.7)
MCV RBC AUTO: 91 FL (ref 79–97)
MONOCYTES # BLD AUTO: 0.9 X10E3/UL (ref 0.1–0.9)
MONOCYTES NFR BLD AUTO: 7 %
NEUTROPHILS # BLD AUTO: 9.1 X10E3/UL (ref 1.4–7)
NEUTROPHILS NFR BLD AUTO: 66 %
PLATELET # BLD AUTO: 320 X10E3/UL (ref 150–450)
POTASSIUM SERPL-SCNC: 4.9 MMOL/L (ref 3.5–5.2)
PROT SERPL-MCNC: 7 G/DL (ref 6–8.5)
RBC # BLD AUTO: 4.99 X10E6/UL (ref 3.77–5.28)
SODIUM SERPL-SCNC: 141 MMOL/L (ref 134–144)
TRIGL SERPL-MCNC: 103 MG/DL (ref 0–149)
TSH SERPL DL<=0.005 MIU/L-ACNC: 0.77 UIU/ML (ref 0.45–4.5)
VIT B12 SERPL-MCNC: 290 PG/ML (ref 232–1245)
VLDLC SERPL CALC-MCNC: 19 MG/DL (ref 5–40)
WBC # BLD AUTO: 13.6 X10E3/UL (ref 3.4–10.8)

## 2022-07-07 ENCOUNTER — TELEPHONE (OUTPATIENT)
Dept: FAMILY MEDICINE CLINIC | Facility: CLINIC | Age: 57
End: 2022-07-07

## 2022-07-08 ENCOUNTER — OFFICE VISIT (OUTPATIENT)
Dept: FAMILY MEDICINE CLINIC | Facility: CLINIC | Age: 57
End: 2022-07-08

## 2022-07-08 VITALS
DIASTOLIC BLOOD PRESSURE: 80 MMHG | HEIGHT: 62 IN | SYSTOLIC BLOOD PRESSURE: 122 MMHG | WEIGHT: 150 LBS | BODY MASS INDEX: 27.6 KG/M2

## 2022-07-08 DIAGNOSIS — M25.559 HIP PAIN: Primary | ICD-10-CM

## 2022-07-08 PROCEDURE — 99213 OFFICE O/P EST LOW 20 MIN: CPT | Performed by: STUDENT IN AN ORGANIZED HEALTH CARE EDUCATION/TRAINING PROGRAM

## 2022-07-08 RX ORDER — METHOCARBAMOL 500 MG/1
500 TABLET, FILM COATED ORAL 3 TIMES DAILY PRN
Qty: 30 TABLET | Refills: 1 | Status: SHIPPED | OUTPATIENT
Start: 2022-07-08 | End: 2022-10-24

## 2022-07-08 RX ORDER — PREDNISONE 10 MG/1
40 TABLET ORAL DAILY
Qty: 20 TABLET | Refills: 0 | Status: SHIPPED | OUTPATIENT
Start: 2022-07-08 | End: 2022-07-13

## 2022-07-08 NOTE — PROGRESS NOTES
"Chief Complaint  Hip Pain    Subjective          Kalli Lemons presents to [facility]  History of Present Illness    She states that she has been having pain in the hips bilaterally.  She states that she has been more active recently and feels as if it is tight and her hips are \"clicking\".  She has never had x-rays and states that while this is happened before it is not very frequent.    Objective   Vital Signs:   /80 (BP Location: Left arm, Patient Position: Sitting, Cuff Size: Adult)   Ht 157.5 cm (62\")   Wt 68 kg (150 lb)   BMI 27.44 kg/m²     Body mass index is 27.44 kg/m².    Review of Systems    Past History:  Medical History: has a past medical history of Acute non-recurrent frontal sinusitis, Anxiety, BMI 30.0-30.9,adult, Cigarette nicotine dependence with nicotine-induced disorder, Cough, Depression, Extremity numbness, Fatigue, Irritable bowel syndrome with diarrhea, and Polyuria.   Surgical History: has a past surgical history that includes Hysterectomy and Cholecystectomy.   Family History: family history includes Cancer in her brother.   Social History: reports that she has been smoking cigarettes. She started smoking about 34 years ago. She has been smoking about 1.00 pack per day. She has never used smokeless tobacco. She reports current drug use. Drug: Marijuana. She reports that she does not drink alcohol.      Current Outpatient Medications:   •  Cholecalciferol (Vitamin D3) 50 MCG (2000 UT) tablet, Take 2 tablets by mouth Daily., Disp: , Rfl:   •  methocarbamol (Robaxin) 500 MG tablet, Take 1 tablet by mouth 3 (Three) Times a Day As Needed for Muscle Spasms., Disp: 30 tablet, Rfl: 1  •  predniSONE (DELTASONE) 10 MG tablet, Take 4 tablets by mouth Daily for 5 days., Disp: 20 tablet, Rfl: 0  •  vitamin B-12 (CYANOCOBALAMIN) 1000 MCG tablet, Take 1,000 mcg by mouth Daily., Disp: , Rfl:     Allergies: Patient has no known allergies.    Physical " Exam  Constitutional:       General: She is not in acute distress.     Appearance: She is not ill-appearing or toxic-appearing.   HENT:      Head: Normocephalic and atraumatic.   Pulmonary:      Effort: Pulmonary effort is normal. No respiratory distress.   Musculoskeletal:      Comments: Tender of trochanteric bursa.    Neurological:      General: No focal deficit present.      Mental Status: She is alert and oriented to person, place, and time.      Gait: Gait normal.   Psychiatric:         Mood and Affect: Mood normal.         Thought Content: Thought content normal.          Result Review :                   Assessment and Plan    Diagnoses and all orders for this visit:    1. Hip pain (Primary)  Assessment & Plan:  Prednisone burst and muscle relaxants for pain. Follow up in 1-2 weeks if no improvement for Xrays and PT.       Other orders  -     methocarbamol (Robaxin) 500 MG tablet; Take 1 tablet by mouth 3 (Three) Times a Day As Needed for Muscle Spasms.  Dispense: 30 tablet; Refill: 1  -     predniSONE (DELTASONE) 10 MG tablet; Take 4 tablets by mouth Daily for 5 days.  Dispense: 20 tablet; Refill: 0      Follow Up   No follow-ups on file.  Patient was given instructions and counseling regarding her condition or for health maintenance advice. Please see specific information pulled into the AVS if appropriate.     Chary Escobar, DO

## 2022-07-08 NOTE — ASSESSMENT & PLAN NOTE
Prednisone burst and muscle relaxants for pain. Follow up in 1-2 weeks if no improvement for Xrays and PT.

## 2022-07-18 ENCOUNTER — PATIENT MESSAGE (OUTPATIENT)
Dept: FAMILY MEDICINE CLINIC | Facility: CLINIC | Age: 57
End: 2022-07-18

## 2022-07-18 DIAGNOSIS — M25.559 HIP PAIN: Primary | ICD-10-CM

## 2022-07-18 NOTE — TELEPHONE ENCOUNTER
From: Kalli Lemons  To: Chary Escobar DO  Sent: 7/18/2022 9:35 AM EDT  Subject: Hip pain    The steroids helped with the pain but the muscle relaxers made me constipated.   I am planning on going to a chiropractor.     What do you think?

## 2022-10-03 ENCOUNTER — TELEPHONE (OUTPATIENT)
Dept: FAMILY MEDICINE CLINIC | Facility: CLINIC | Age: 57
End: 2022-10-03

## 2022-10-14 NOTE — TELEPHONE ENCOUNTER
Will you review the CT scan? It is under media, discharge summary, on the 13th page. Do you want a hospital follow up for this?

## 2022-10-24 ENCOUNTER — PATIENT MESSAGE (OUTPATIENT)
Dept: FAMILY MEDICINE CLINIC | Facility: CLINIC | Age: 57
End: 2022-10-24

## 2022-10-24 ENCOUNTER — OFFICE VISIT (OUTPATIENT)
Dept: FAMILY MEDICINE CLINIC | Facility: CLINIC | Age: 57
End: 2022-10-24

## 2022-10-24 VITALS
BODY MASS INDEX: 26.94 KG/M2 | HEIGHT: 62 IN | HEART RATE: 75 BPM | OXYGEN SATURATION: 97 % | SYSTOLIC BLOOD PRESSURE: 130 MMHG | DIASTOLIC BLOOD PRESSURE: 84 MMHG | WEIGHT: 146.4 LBS

## 2022-10-24 DIAGNOSIS — R31.9 HEMATURIA, UNSPECIFIED TYPE: ICD-10-CM

## 2022-10-24 DIAGNOSIS — E78.2 MIXED HYPERLIPIDEMIA: ICD-10-CM

## 2022-10-24 DIAGNOSIS — K57.92 DIVERTICULITIS: Primary | ICD-10-CM

## 2022-10-24 DIAGNOSIS — D72.829 LEUKOCYTOSIS, UNSPECIFIED TYPE: ICD-10-CM

## 2022-10-24 LAB
BILIRUB BLD-MCNC: NEGATIVE MG/DL
CLARITY, POC: CLEAR
COLOR UR: YELLOW
EXPIRATION DATE: NORMAL
GLUCOSE UR STRIP-MCNC: NEGATIVE MG/DL
KETONES UR QL: NEGATIVE
LEUKOCYTE EST, POC: NEGATIVE
Lab: NORMAL
NITRITE UR-MCNC: NEGATIVE MG/ML
PH UR: 6 [PH] (ref 5–8)
PROT UR STRIP-MCNC: NEGATIVE MG/DL
RBC # UR STRIP: NEGATIVE /UL
SP GR UR: 1.01 (ref 1–1.03)
UROBILINOGEN UR QL: NORMAL

## 2022-10-24 PROCEDURE — 99214 OFFICE O/P EST MOD 30 MIN: CPT | Performed by: NURSE PRACTITIONER

## 2022-10-24 PROCEDURE — 81003 URINALYSIS AUTO W/O SCOPE: CPT | Performed by: NURSE PRACTITIONER

## 2022-10-24 NOTE — PROGRESS NOTES
"Chief Complaint  HFU    Subjective          Kalli Lemons presents to [facility]  History of Present Illness     Went to [facility] on 9/13/2022 due to the left diffuse abdominal pain.  CT scan abdomen pelvis was completed and showed segmental bowel wall thickening with numerous diverticula along the mid distal sigmoid colon and pericolonic edema with trace free fluid.  Diagnosed with sigmoid diverticulitis and was treated with Augmentin.  She was also found to have a UTI.  She states she is feeling much better and has returned to normal for the most part.    White blood cell count was elevated at 14.4.    Last colonoscopy was with Dr. Otero at [facility] on April 2021.    States she thinks she also may have a left-sided abdominal hernia.  States only at times when she does heavy lifting she will have a slight pressure sensation of her left-sided abdomen area.  No bulge or mass that she can palpate.  States she actually saw Dr. Brown last week and he told her it was likely still just inflammation from her past diverticulitis and told her most likely not a hernia.  She is wondering if she should see a different surgeon or seeing her gastroenterologist.  No urinary or bowel issues.  No fever no chills no body aches.  Overall states she is doing well.    Objective   Vital Signs:   /84   Pulse 75   Ht 157.5 cm (62\")   Wt 66.4 kg (146 lb 6.4 oz)   SpO2 97%   BMI 26.78 kg/m²     Body mass index is 26.78 kg/m².    Review of Systems   Constitutional: Negative for chills, fatigue and fever.   Respiratory: Negative for cough, shortness of breath and wheezing.    Cardiovascular: Negative for chest pain.   Gastrointestinal: Negative for abdominal pain, blood in stool, constipation, diarrhea, nausea, vomiting, GERD and indigestion.   Genitourinary: Negative for decreased urine volume, dysuria, frequency, hematuria and urgency.   Neurological: Negative for headache. "   Psychiatric/Behavioral: Negative for suicidal ideas.       Past History:  Medical History: has a past medical history of Acute non-recurrent frontal sinusitis, Anxiety, BMI 30.0-30.9,adult, Cigarette nicotine dependence with nicotine-induced disorder, Cough, Depression, Extremity numbness, Fatigue, Irritable bowel syndrome with diarrhea, and Polyuria.   Surgical History: has a past surgical history that includes Hysterectomy and Cholecystectomy.   Family History: family history includes Cancer in her brother.   Social History: reports that she has been smoking cigarettes. She started smoking about 34 years ago. She has a 31.00 pack-year smoking history. She has never used smokeless tobacco. She reports current drug use. Drug: Marijuana. She reports that she does not drink alcohol.    PHQ-2 Depression Screening  Little interest or pleasure in doing things? 0-->not at all   Feeling down, depressed, or hopeless? 0-->not at all   PHQ-2 Total Score 0        PHQ-9 Depression Screening  Little interest or pleasure in doing things? 0-->not at all   Feeling down, depressed, or hopeless? 0-->not at all   Trouble falling or staying asleep, or sleeping too much?     Feeling tired or having little energy?     Poor appetite or overeating?     Feeling bad about yourself - or that you are a failure or have let yourself or your family down?     Trouble concentrating on things, such as reading the newspaper or watching television?     Moving or speaking so slowly that other people could have noticed? Or the opposite - being so fidgety or restless that you have been moving around a lot more than usual?     Thoughts that you would be better off dead, or of hurting yourself in some way?     PHQ-9 Total Score 0   If you checked off any problems, how difficult have these problems made it for you to do your work, take care of things at home, or get along with other people?       PHQ-9 Total Score: 0      Patient screened positive for  depression based on a PHQ-9 score of 0 on 10/24/2022. Follow-up recommendations include:        No current outpatient medications on file.   (Not in a hospital admission)     Allergies: Patient has no known allergies.    Physical Exam  Constitutional:       Appearance: Normal appearance.   Eyes:      Extraocular Movements: Extraocular movements intact.      Conjunctiva/sclera: Conjunctivae normal.      Pupils: Pupils are equal, round, and reactive to light.   Cardiovascular:      Rate and Rhythm: Normal rate and regular rhythm.      Heart sounds: Normal heart sounds.   Pulmonary:      Effort: Pulmonary effort is normal.      Breath sounds: Normal breath sounds.   Abdominal:      General: Abdomen is flat. Bowel sounds are normal. There is no distension.      Palpations: Abdomen is soft. There is no mass.      Tenderness: There is no abdominal tenderness. There is no guarding or rebound.      Hernia: No hernia is present.   Skin:     General: Skin is warm.      Findings: No erythema or rash.   Neurological:      General: No focal deficit present.      Mental Status: She is alert and oriented to person, place, and time. Mental status is at baseline.   Psychiatric:         Mood and Affect: Mood normal.         Behavior: Behavior normal.         Thought Content: Thought content normal.         Judgment: Judgment normal.          Result Review :                   Assessment and Plan    Diagnoses and all orders for this visit:    1. Diverticulitis (Primary)  Assessment & Plan:  States she has improved.  In the hospital CBC showed elevated white blood cell counts we will recheck that today.  Due to CT scan report I am also going to go ahead and schedule her follow-up appointment with her gastroenterologist to make sure that she does not need a sooner colonoscopy repeat.  Proper diet and exercise plan discussed and encouraged.  Stay hydrated with water.  We also discussed that pressure sensation that she has in her left  abdominal area only with lifting and that it possibly could be a hernia.  Suggested we could follow-up with general surgery for evaluation of this but she wants to see gastroenterology first and states she will let me know about the general surgery referral after she speaks with her gastroenterologist.  Education provided she knows to go to the hospital if worsens.  Return to clinic or ED with any issues or concerns.    Orders:  -     Ambulatory Referral to Gastroenterology    2. Mixed hyperlipidemia  Assessment & Plan:  She is fasting today so we will check a lipid.  Proper diet exercise plan discussed and encouraged.  Return to clinic or ED with any issues or concerns.    Orders:  -     Lipid Panel; Future    3. Hematuria, unspecified type  Assessment & Plan:  Recheck UA today.  States no urinary symptoms.    Orders:  -     POC Urinalysis Dipstick, Automated; Future    4. Leukocytosis, unspecified type  Assessment & Plan:  Recheck CBC today.    Orders:  -     CBC & Differential; Future            BMI is >= 25 and <30. (Overweight) The following options were offered after discussion;: exercise counseling/recommendations and nutrition counseling/recommendations       Follow Up   Return if symptoms worsen or fail to improve.  Patient was given instructions and counseling regarding her condition or for health maintenance advice. Please see specific information pulled into the AVS if appropriate.     PEGGY Wilkins

## 2022-10-24 NOTE — ASSESSMENT & PLAN NOTE
She is fasting today so we will check a lipid.  Proper diet exercise plan discussed and encouraged.  Return to clinic or ED with any issues or concerns.

## 2022-10-24 NOTE — ASSESSMENT & PLAN NOTE
States she has improved.  In the hospital CBC showed elevated white blood cell counts we will recheck that today.  Due to CT scan report I am also going to go ahead and schedule her follow-up appointment with her gastroenterologist to make sure that she does not need a sooner colonoscopy repeat.  Proper diet and exercise plan discussed and encouraged.  Stay hydrated with water.  We also discussed that pressure sensation that she has in her left abdominal area only with lifting and that it possibly could be a hernia.  Suggested we could follow-up with general surgery for evaluation of this but she wants to see gastroenterology first and states she will let me know about the general surgery referral after she speaks with her gastroenterologist.  Education provided she knows to go to the hospital if worsens.  Return to clinic or ED with any issues or concerns.

## 2022-10-25 ENCOUNTER — TELEPHONE (OUTPATIENT)
Dept: FAMILY MEDICINE CLINIC | Facility: CLINIC | Age: 57
End: 2022-10-25

## 2022-10-25 LAB
BASOPHILS # BLD AUTO: 0.1 X10E3/UL (ref 0–0.2)
BASOPHILS NFR BLD AUTO: 1 %
CHOLEST SERPL-MCNC: 224 MG/DL (ref 100–199)
EOSINOPHIL # BLD AUTO: 0.1 X10E3/UL (ref 0–0.4)
EOSINOPHIL NFR BLD AUTO: 1 %
ERYTHROCYTE [DISTWIDTH] IN BLOOD BY AUTOMATED COUNT: 12.6 % (ref 11.7–15.4)
HCT VFR BLD AUTO: 45.5 % (ref 34–46.6)
HDLC SERPL-MCNC: 55 MG/DL
HGB BLD-MCNC: 15 G/DL (ref 11.1–15.9)
IMM GRANULOCYTES # BLD AUTO: 0.1 X10E3/UL (ref 0–0.1)
IMM GRANULOCYTES NFR BLD AUTO: 1 %
LDLC SERPL CALC-MCNC: 149 MG/DL (ref 0–99)
LYMPHOCYTES # BLD AUTO: 3.6 X10E3/UL (ref 0.7–3.1)
LYMPHOCYTES NFR BLD AUTO: 26 %
MCH RBC QN AUTO: 29 PG (ref 26.6–33)
MCHC RBC AUTO-ENTMCNC: 33 G/DL (ref 31.5–35.7)
MCV RBC AUTO: 88 FL (ref 79–97)
MONOCYTES # BLD AUTO: 0.7 X10E3/UL (ref 0.1–0.9)
MONOCYTES NFR BLD AUTO: 5 %
NEUTROPHILS # BLD AUTO: 9.2 X10E3/UL (ref 1.4–7)
NEUTROPHILS NFR BLD AUTO: 66 %
PLATELET # BLD AUTO: 313 X10E3/UL (ref 150–450)
RBC # BLD AUTO: 5.17 X10E6/UL (ref 3.77–5.28)
TRIGL SERPL-MCNC: 113 MG/DL (ref 0–149)
VLDLC SERPL CALC-MCNC: 20 MG/DL (ref 5–40)
WBC # BLD AUTO: 13.8 X10E3/UL (ref 3.4–10.8)

## 2022-10-25 NOTE — TELEPHONE ENCOUNTER
Please let patient know that due to her cholesterol levels she needs to limit her fat intake.Limit intake of red meats, fast foods, greasy foods. Getting 30 minutes exercise most days of the week, even just walking, would be beneficial in helping to get this levels down. Thanks

## 2022-10-25 NOTE — TELEPHONE ENCOUNTER
----- Message from Seelna Hendrix MA sent at 10/25/2022  1:23 PM EDT -----  Regarding: FW: Question regarding LIPID PANEL  Contact: [phone]    ----- Message -----  From: Kalli Lemons  Sent: 10/25/2022   9:10 AM EDT  To: Mark Daily [facility]  Subject: Question regarding LIPID PANEL                   What type  of diet do I need to correct this?

## 2022-10-27 RX ORDER — BUPROPION HYDROCHLORIDE 150 MG/1
150 TABLET, EXTENDED RELEASE ORAL 2 TIMES DAILY
Qty: 60 TABLET | Refills: 2 | Status: SHIPPED | OUTPATIENT
Start: 2022-10-27 | End: 2023-01-18

## 2022-11-01 ENCOUNTER — TELEPHONE (OUTPATIENT)
Dept: FAMILY MEDICINE CLINIC | Facility: CLINIC | Age: 57
End: 2022-11-01

## 2022-11-01 ENCOUNTER — PATIENT MESSAGE (OUTPATIENT)
Dept: FAMILY MEDICINE CLINIC | Facility: CLINIC | Age: 57
End: 2022-11-01

## 2022-11-02 ENCOUNTER — TELEPHONE (OUTPATIENT)
Dept: FAMILY MEDICINE CLINIC | Facility: CLINIC | Age: 57
End: 2022-11-02

## 2022-11-02 DIAGNOSIS — Z72.0 TOBACCO USE: ICD-10-CM

## 2022-11-02 DIAGNOSIS — Z12.2 SCREENING FOR LUNG CANCER: ICD-10-CM

## 2022-11-02 DIAGNOSIS — Z12.31 ENCOUNTER FOR SCREENING MAMMOGRAM FOR MALIGNANT NEOPLASM OF BREAST: Primary | ICD-10-CM

## 2022-11-02 NOTE — TELEPHONE ENCOUNTER
Please let patient know that I have put in orders for her mammogram and lung CT scan.  Someone should be contacting her in the near future with those appointments.  Thanks

## 2022-11-02 NOTE — TELEPHONE ENCOUNTER
Regarding: Lung scan and Mammogram  Contact: [phone]  ----- Message from Selena Hendrix MA sent at 11/2/2022 10:30 AM EDT -----       ----- Message from Kalli Lemons to Jacques Lord APRN sent at 11/1/2022 10:55 AM -----   Good morning Jacques I called to schedule a mammogram and a lung screen and they both said that you had not put in a request for either one of those and that that needs to be done before they can schedule me.   Also my chart is saying that we had an appointment today that I was not aware of.   Thank you!

## 2022-11-05 NOTE — TELEPHONE ENCOUNTER
There has already been a Aptela message sent to юлия about this and he has already ordered. Ill respond on Aptela to her. thanks

## 2022-11-16 ENCOUNTER — APPOINTMENT (OUTPATIENT)
Dept: WOMENS IMAGING | Facility: HOSPITAL | Age: 57
End: 2022-11-16

## 2022-11-16 PROCEDURE — 77067 SCR MAMMO BI INCL CAD: CPT | Performed by: RADIOLOGY

## 2023-01-11 ENCOUNTER — TELEPHONE (OUTPATIENT)
Dept: FAMILY MEDICINE CLINIC | Facility: CLINIC | Age: 58
End: 2023-01-11
Payer: COMMERCIAL

## 2023-01-11 NOTE — TELEPHONE ENCOUNTER
----- Message from Cici Wakefield MA sent at 1/11/2023  1:35 PM EST -----  Regarding: FW: Specialist Appointment   Contact: [phone]  See below, does she need urology referral?    ----- Message -----  From: Kalli Lemons  Sent: 1/9/2023   4:03 PM EST  To: [facility]  Subject: Specialist Appointment                           Dr Robert found a spot, we're supposed to follow-up in March.    But thank you, will you let me know about the urologist?    I'm sure it needs pre-approval

## 2023-01-11 NOTE — TELEPHONE ENCOUNTER
See below. To make sure the appropriate referrals are made would recommend an appointment to discuss. She can do telehealth if that is easier for her. Thanks

## 2023-01-12 NOTE — TELEPHONE ENCOUNTER
To make sure the appropriate referrals are made would recommend an appointment to discuss. She can do telehealth if that is easier for her. Thanks

## 2023-01-18 ENCOUNTER — OFFICE VISIT (OUTPATIENT)
Dept: FAMILY MEDICINE CLINIC | Facility: CLINIC | Age: 58
End: 2023-01-18
Payer: COMMERCIAL

## 2023-01-18 ENCOUNTER — PATIENT MESSAGE (OUTPATIENT)
Dept: FAMILY MEDICINE CLINIC | Facility: CLINIC | Age: 58
End: 2023-01-18

## 2023-01-18 VITALS
SYSTOLIC BLOOD PRESSURE: 138 MMHG | HEIGHT: 62 IN | HEART RATE: 73 BPM | DIASTOLIC BLOOD PRESSURE: 86 MMHG | TEMPERATURE: 98.2 F | BODY MASS INDEX: 26.68 KG/M2 | WEIGHT: 145 LBS | OXYGEN SATURATION: 97 %

## 2023-01-18 DIAGNOSIS — I10 BENIGN ESSENTIAL HTN: Primary | ICD-10-CM

## 2023-01-18 DIAGNOSIS — E78.2 MIXED HYPERLIPIDEMIA: ICD-10-CM

## 2023-01-18 DIAGNOSIS — R10.30 LOWER ABDOMINAL PAIN: ICD-10-CM

## 2023-01-18 DIAGNOSIS — R82.90 NONSPECIFIC FINDING ON EXAMINATION OF URINE: ICD-10-CM

## 2023-01-18 DIAGNOSIS — D72.829 LEUKOCYTOSIS, UNSPECIFIED TYPE: ICD-10-CM

## 2023-01-18 LAB
BILIRUB BLD-MCNC: NEGATIVE MG/DL
CLARITY, POC: CLEAR
COLOR UR: YELLOW
EXPIRATION DATE: ABNORMAL
GLUCOSE UR STRIP-MCNC: NEGATIVE MG/DL
KETONES UR QL: NEGATIVE
LEUKOCYTE EST, POC: NEGATIVE
Lab: ABNORMAL
NITRITE UR-MCNC: NEGATIVE MG/ML
PH UR: 6.5 [PH] (ref 5–8)
PROT UR STRIP-MCNC: NEGATIVE MG/DL
RBC # UR STRIP: ABNORMAL /UL
SP GR UR: 1.01 (ref 1–1.03)
UROBILINOGEN UR QL: NORMAL

## 2023-01-18 PROCEDURE — 36415 COLL VENOUS BLD VENIPUNCTURE: CPT | Performed by: NURSE PRACTITIONER

## 2023-01-18 PROCEDURE — 99213 OFFICE O/P EST LOW 20 MIN: CPT | Performed by: NURSE PRACTITIONER

## 2023-01-18 PROCEDURE — 81003 URINALYSIS AUTO W/O SCOPE: CPT | Performed by: NURSE PRACTITIONER

## 2023-01-18 NOTE — PROGRESS NOTES
"Chief Complaint  Follow-up    Subjective          Kalli Lemons presents to [facility]  History of Present Illness     Patient presents for recheck of cholesterol levels.  3 months ago total cholesterol was elevated 224 and LDL was elevated at 149.  Tries to watch her diet she does stay active.  She is fasting today.    She also informed me that she continues to have intermittent lower abdominal pain that comes and goes.  She states she has been drinking a lot of water with artificial flavoring in it but states over the past 2 weeks she has stopped the artificial flavor and her abdominal issues have greatly improved.  States her bowels are now regular and normal and there is no constipation.  No pain currently.  She states she is following up regularly with her gynecologist Dr. Robert.  States 3 months ago they did an intravaginal ultrasound and she states he told her there was some type of mass somewhere and he wanted to monitor that again in 6 months.  Has repeat ultrasound in March with him.  Also history of diverticulitis and states she has an appointment with gastroenterology in 2 weeks for further evaluation of her intermittent abdominal pain.  Denies further work-up of this today and just wanted to keep me updated.    No fever no chills no body aches.  No dizziness no headache no chest pain no chest pressure no shortness of breath no urinary or bowel issues.    She also states she never started the Wellbutrin.  States she is not ready to quit smoking.  Smokes about a pack a day.  States she is hoping to quit in the springtime.    Objective   Vital Signs:   /86   Pulse 73   Temp 98.2 °F (36.8 °C)   Ht 157.5 cm (62\")   Wt 65.8 kg (145 lb)   SpO2 97%   BMI 26.52 kg/m²     Body mass index is 26.52 kg/m².    Review of Systems   Constitutional: Negative for chills, fatigue, fever and unexpected weight loss.   HENT: Negative for congestion.    Eyes: Negative for visual " disturbance.   Respiratory: Negative for cough, chest tightness, shortness of breath and wheezing.    Cardiovascular: Negative.    Gastrointestinal: Positive for abdominal pain. Negative for constipation, diarrhea, nausea, vomiting and indigestion.   Genitourinary: Negative for decreased urine volume, dysuria, frequency, hematuria and urgency.   Musculoskeletal: Negative for arthralgias and back pain.   Neurological: Negative for dizziness and headache.   Psychiatric/Behavioral: Negative for suicidal ideas.       Past History:  Medical History: has a past medical history of Acute non-recurrent frontal sinusitis, Anxiety, BMI 30.0-30.9,adult, Cigarette nicotine dependence with nicotine-induced disorder, Cough, Depression, Extremity numbness, Fatigue, Irritable bowel syndrome with diarrhea, and Polyuria.   Surgical History: has a past surgical history that includes Hysterectomy and Cholecystectomy.   Family History: family history includes Cancer in her brother.   Social History: reports that she has been smoking cigarettes. She started smoking about 35 years ago. She has a 31.00 pack-year smoking history. She has never used smokeless tobacco. She reports current drug use. Drug: Marijuana. She reports that she does not drink alcohol.    PHQ-2 Depression Screening  Little interest or pleasure in doing things? 0-->not at all   Feeling down, depressed, or hopeless? 0-->not at all   PHQ-2 Total Score 0        PHQ-9 Depression Screening  Little interest or pleasure in doing things? 0-->not at all   Feeling down, depressed, or hopeless? 0-->not at all   Trouble falling or staying asleep, or sleeping too much?     Feeling tired or having little energy?     Poor appetite or overeating?     Feeling bad about yourself - or that you are a failure or have let yourself or your family down?     Trouble concentrating on things, such as reading the newspaper or watching television?     Moving or speaking so slowly that other people  could have noticed? Or the opposite - being so fidgety or restless that you have been moving around a lot more than usual?     Thoughts that you would be better off dead, or of hurting yourself in some way?     PHQ-9 Total Score 0   If you checked off any problems, how difficult have these problems made it for you to do your work, take care of things at home, or get along with other people?       PHQ-9 Total Score: 0      Patient screened positive for depression based on a PHQ-9 score of 0 on 1/18/2023. Follow-up recommendations include:       No current outpatient medications on file.   (Not in a hospital admission)     Allergies: Patient has no known allergies.    Physical Exam  Constitutional:       Appearance: Normal appearance.   Eyes:      Conjunctiva/sclera: Conjunctivae normal.      Pupils: Pupils are equal, round, and reactive to light.   Cardiovascular:      Rate and Rhythm: Normal rate and regular rhythm.      Heart sounds: Normal heart sounds.   Pulmonary:      Effort: Pulmonary effort is normal.      Breath sounds: Normal breath sounds.   Abdominal:      General: Abdomen is flat. Bowel sounds are normal. There is no distension.      Palpations: Abdomen is soft.      Tenderness: There is no abdominal tenderness. There is no right CVA tenderness, left CVA tenderness, guarding or rebound.   Skin:     General: Skin is warm.      Findings: No erythema or rash.   Neurological:      General: No focal deficit present.      Mental Status: She is alert and oriented to person, place, and time. Mental status is at baseline.   Psychiatric:         Mood and Affect: Mood normal.         Behavior: Behavior normal.         Thought Content: Thought content normal.         Judgment: Judgment normal.          Result Review :                   Assessment and Plan    Diagnoses and all orders for this visit:    1. Benign essential HTN (Primary)  Assessment & Plan:  Labs drawn.  UA completed.  Goal blood pressure less than  than 140/90.  Let me know if gets to or above that.  Proper diet and exercise plan discussed and encouraged.  Smoking cessation discussed and encouraged.  Return to clinic or ED with any issues or concerns.    Orders:  -     CBC & Differential; Future  -     Comprehensive Metabolic Panel; Future  -     POC Urinalysis Dipstick, Automated; Future  -     CBC & Differential  -     Comprehensive Metabolic Panel    2. Mixed hyperlipidemia  Assessment & Plan:  Labs drawn.  UA completed.  Goal blood pressure less than than 140/90.  Let me know if gets to or above that.  Proper diet and exercise plan discussed and encouraged.  Return to clinic or ED with any issues or concerns.    Orders:  -     Lipid Panel; Future  -     Lipid Panel    3. Leukocytosis, unspecified type  Assessment & Plan:  Recheck labs today.    Orders:  -     CBC & Differential; Future  -     CBC & Differential    4. Lower abdominal pain  Assessment & Plan:  Patient denies further work-up of this and just wanted to update me on what is going on.  She will keep follow-up with her gynecologist in March for repeat intrauterine ultrasound for evaluation of the mass that she states he noticed and she will also keep her appointment with GI in 2 weeks (she states she has this scheduled).  Denies further work-up and just wanted to let me know.  Today though we will check a UA and some blood work.  UA shows trace blood.  Culture sent.  Call in 2 days for results.  Return in 1 to 2 weeks for repeat UA to make sure clears.  She knows to go to the emergency department if this worsens at all.  Return to clinic or ED with any issues or concerns.              BMI is >= 25 and <30. (Overweight) The following options were offered after discussion;: exercise counseling/recommendations and nutrition counseling/recommendations       Follow Up   Return in about 6 months (around 7/18/2023).  Patient was given instructions and counseling regarding her condition or for health  maintenance advice. Please see specific information pulled into the AVS if appropriate.     PEGGY Wilkins

## 2023-01-18 NOTE — ASSESSMENT & PLAN NOTE
Labs drawn.  UA completed.  Goal blood pressure less than than 140/90.  Let me know if gets to or above that.  Proper diet and exercise plan discussed and encouraged.  Return to clinic or ED with any issues or concerns.

## 2023-01-18 NOTE — ASSESSMENT & PLAN NOTE
Labs drawn.  UA completed.  Goal blood pressure less than than 140/90.  Let me know if gets to or above that.  Proper diet and exercise plan discussed and encouraged.  Smoking cessation discussed and encouraged.  Return to clinic or ED with any issues or concerns.

## 2023-01-18 NOTE — ASSESSMENT & PLAN NOTE
Patient denies further work-up of this and just wanted to update me on what is going on.  She will keep follow-up with her gynecologist in March for repeat intrauterine ultrasound for evaluation of the mass that she states he noticed and she will also keep her appointment with GI in 2 weeks (she states she has this scheduled).  Denies further work-up and just wanted to let me know.  Today though we will check a UA and some blood work.  UA shows trace blood.  Culture sent.  Call in 2 days for results.  Return in 1 to 2 weeks for repeat UA to make sure clears.  She knows to go to the emergency department if this worsens at all.  Return to clinic or ED with any issues or concerns.

## 2023-01-19 LAB
ALBUMIN SERPL-MCNC: 4.5 G/DL (ref 3.8–4.9)
ALBUMIN/GLOB SERPL: 1.7 {RATIO} (ref 1.2–2.2)
ALP SERPL-CCNC: 86 IU/L (ref 44–121)
ALT SERPL-CCNC: 12 IU/L (ref 0–32)
AST SERPL-CCNC: 13 IU/L (ref 0–40)
BASOPHILS # BLD AUTO: 0.1 X10E3/UL (ref 0–0.2)
BASOPHILS NFR BLD AUTO: 1 %
BILIRUB SERPL-MCNC: 0.3 MG/DL (ref 0–1.2)
BUN SERPL-MCNC: 9 MG/DL (ref 6–24)
BUN/CREAT SERPL: 15 (ref 9–23)
CALCIUM SERPL-MCNC: 9.4 MG/DL (ref 8.7–10.2)
CHLORIDE SERPL-SCNC: 104 MMOL/L (ref 96–106)
CHOLEST SERPL-MCNC: 205 MG/DL (ref 100–199)
CO2 SERPL-SCNC: 23 MMOL/L (ref 20–29)
CREAT SERPL-MCNC: 0.59 MG/DL (ref 0.57–1)
EGFRCR SERPLBLD CKD-EPI 2021: 104 ML/MIN/1.73
EOSINOPHIL # BLD AUTO: 0.1 X10E3/UL (ref 0–0.4)
EOSINOPHIL NFR BLD AUTO: 1 %
ERYTHROCYTE [DISTWIDTH] IN BLOOD BY AUTOMATED COUNT: 13.5 % (ref 11.7–15.4)
GLOBULIN SER CALC-MCNC: 2.6 G/DL (ref 1.5–4.5)
GLUCOSE SERPL-MCNC: 80 MG/DL (ref 70–99)
HCT VFR BLD AUTO: 46.3 % (ref 34–46.6)
HDLC SERPL-MCNC: 51 MG/DL
HGB BLD-MCNC: 15.1 G/DL (ref 11.1–15.9)
IMM GRANULOCYTES # BLD AUTO: 0.2 X10E3/UL (ref 0–0.1)
IMM GRANULOCYTES NFR BLD AUTO: 1 %
LDLC SERPL CALC-MCNC: 133 MG/DL (ref 0–99)
LYMPHOCYTES # BLD AUTO: 3.8 X10E3/UL (ref 0.7–3.1)
LYMPHOCYTES NFR BLD AUTO: 30 %
MCH RBC QN AUTO: 28.6 PG (ref 26.6–33)
MCHC RBC AUTO-ENTMCNC: 32.6 G/DL (ref 31.5–35.7)
MCV RBC AUTO: 88 FL (ref 79–97)
MONOCYTES # BLD AUTO: 0.8 X10E3/UL (ref 0.1–0.9)
MONOCYTES NFR BLD AUTO: 7 %
NEUTROPHILS # BLD AUTO: 7.6 X10E3/UL (ref 1.4–7)
NEUTROPHILS NFR BLD AUTO: 60 %
PLATELET # BLD AUTO: 385 X10E3/UL (ref 150–450)
POTASSIUM SERPL-SCNC: 4.9 MMOL/L (ref 3.5–5.2)
PROT SERPL-MCNC: 7.1 G/DL (ref 6–8.5)
RBC # BLD AUTO: 5.28 X10E6/UL (ref 3.77–5.28)
SODIUM SERPL-SCNC: 143 MMOL/L (ref 134–144)
TRIGL SERPL-MCNC: 115 MG/DL (ref 0–149)
VLDLC SERPL CALC-MCNC: 21 MG/DL (ref 5–40)
WBC # BLD AUTO: 12.5 X10E3/UL (ref 3.4–10.8)

## 2023-01-22 LAB
BACTERIA UR CULT: ABNORMAL
BACTERIA UR CULT: ABNORMAL
OTHER ANTIBIOTIC SUSC ISLT: ABNORMAL

## 2023-01-23 RX ORDER — NITROFURANTOIN 25; 75 MG/1; MG/1
100 CAPSULE ORAL 2 TIMES DAILY
Qty: 10 CAPSULE | Refills: 0 | Status: SHIPPED | OUTPATIENT
Start: 2023-01-23

## 2023-03-21 ENCOUNTER — TELEPHONE (OUTPATIENT)
Dept: FAMILY MEDICINE CLINIC | Facility: CLINIC | Age: 58
End: 2023-03-21
Payer: COMMERCIAL

## 2023-03-21 NOTE — TELEPHONE ENCOUNTER
See message below, im not sure what she is talking about? If about her colonoscopy results or enema questions would recommend she ask her gastroenterologist who ordered them. Let me know if otherwise. Thanks
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
## 2023-03-22 NOTE — TELEPHONE ENCOUNTER
HUB TO READ    Im not sure what she is talking about? If about her colonoscopy results or enema questions would recommend she ask her gastroenterologist who ordered them. Let me know if otherwise. Thanks    Message left

## 2023-07-07 PROBLEM — Z12.4 SCREENING FOR CERVICAL CANCER: Status: ACTIVE | Noted: 2023-07-07

## 2023-07-07 PROBLEM — Z72.0 TOBACCO USE: Status: ACTIVE | Noted: 2023-07-07

## 2023-07-07 PROBLEM — Z11.59 NEED FOR HEPATITIS C SCREENING TEST: Status: ACTIVE | Noted: 2023-07-07

## 2023-07-07 PROBLEM — Z12.2 SCREENING FOR LUNG CANCER: Status: ACTIVE | Noted: 2023-07-07

## 2023-07-07 PROBLEM — Z12.31 ENCOUNTER FOR SCREENING MAMMOGRAM FOR MALIGNANT NEOPLASM OF BREAST: Status: ACTIVE | Noted: 2023-07-07

## 2023-08-24 ENCOUNTER — OFFICE VISIT (OUTPATIENT)
Dept: FAMILY MEDICINE CLINIC | Facility: CLINIC | Age: 58
End: 2023-08-24
Payer: COMMERCIAL

## 2023-08-24 VITALS
OXYGEN SATURATION: 98 % | SYSTOLIC BLOOD PRESSURE: 120 MMHG | WEIGHT: 141 LBS | HEART RATE: 78 BPM | BODY MASS INDEX: 25.95 KG/M2 | DIASTOLIC BLOOD PRESSURE: 80 MMHG | HEIGHT: 62 IN

## 2023-08-24 DIAGNOSIS — N30.00 ACUTE CYSTITIS WITHOUT HEMATURIA: Primary | ICD-10-CM

## 2023-08-24 PROCEDURE — 99213 OFFICE O/P EST LOW 20 MIN: CPT | Performed by: STUDENT IN AN ORGANIZED HEALTH CARE EDUCATION/TRAINING PROGRAM

## 2023-08-24 RX ORDER — CEPHALEXIN 500 MG/1
500 CAPSULE ORAL 2 TIMES DAILY
Qty: 10 CAPSULE | Refills: 0 | Status: SHIPPED | OUTPATIENT
Start: 2023-08-24

## 2023-08-24 NOTE — PROGRESS NOTES
"Chief Complaint  Abdominal Pain (Pt thinks her bladder dropped)    Subjective          Kalli Lemons presents to [facility]  History of Present Illness    Patient states that she has been having low belly pain for the past for the past week. She states that she has been drinking plenty of water, but she has been drinking more water. She states that she was concerned about her bladder being dropped. She states that she was recommended that she see Urology, and she is waiting on this currently.     She states that she is concerned about it being because of the recent addition of Atorvastatin.     Objective   Vital Signs:   /80 (BP Location: Left arm, Patient Position: Sitting, Cuff Size: Adult)   Pulse 78   Ht 157.5 cm (62\")   Wt 64 kg (141 lb)   SpO2 98%   BMI 25.79 kg/mý     Body mass index is 25.79 kg/mý.    Review of Systems    Past History:  Medical History: has a past medical history of Acute non-recurrent frontal sinusitis, Anxiety, BMI 30.0-30.9,adult, Cigarette nicotine dependence with nicotine-induced disorder, Cough, Depression, Extremity numbness, Fatigue, Irritable bowel syndrome with diarrhea, and Polyuria.   Surgical History: has a past surgical history that includes Hysterectomy and Cholecystectomy.   Family History: family history includes Cancer in her brother.   Social History: reports that she has been smoking cigarettes. She started smoking about 35 years ago. She has a 31.00 pack-year smoking history. She has never used smokeless tobacco. She reports current drug use. Drug: Marijuana. She reports that she does not drink alcohol.      Current Outpatient Medications:     atorvastatin (LIPITOR) 10 MG tablet, Take 1 tablet by mouth Daily., Disp: 90 tablet, Rfl: 0    cephalexin (Keflex) 500 MG capsule, Take 1 capsule by mouth 2 (Two) Times a Day., Disp: 10 capsule, Rfl: 0    Allergies: Patient has no known allergies.    Physical Exam  Constitutional:       " General: She is not in acute distress.     Appearance: She is not toxic-appearing.   Cardiovascular:      Rate and Rhythm: Normal rate and regular rhythm.   Pulmonary:      Effort: Pulmonary effort is normal.      Breath sounds: Normal breath sounds.   Abdominal:      General: Abdomen is flat.      Palpations: Abdomen is soft.      Tenderness: There is abdominal tenderness (suprapubic). There is no right CVA tenderness or left CVA tenderness.   Neurological:      Mental Status: She is alert.   Psychiatric:         Mood and Affect: Mood normal.        Result Review :                   Assessment and Plan    Diagnoses and all orders for this visit:    1. Acute cystitis without hematuria (Primary)    Other orders  -     cephalexin (Keflex) 500 MG capsule; Take 1 capsule by mouth 2 (Two) Times a Day.  Dispense: 10 capsule; Refill: 0    No overt UA on dip. Will send in keflex to cover while culture is pending. Call with any new or worsening symptoms.     Follow Up   No follow-ups on file.  Patient was given instructions and counseling regarding her condition or for health maintenance advice. Please see specific information pulled into the AVS if appropriate.     Chary Escobar, DO

## 2023-10-11 RX ORDER — ATORVASTATIN CALCIUM 10 MG/1
10 TABLET, FILM COATED ORAL DAILY
Qty: 30 TABLET | Refills: 0 | Status: SHIPPED | OUTPATIENT
Start: 2023-10-11

## 2023-11-01 ENCOUNTER — TELEPHONE (OUTPATIENT)
Dept: FAMILY MEDICINE CLINIC | Facility: CLINIC | Age: 58
End: 2023-11-01
Payer: COMMERCIAL

## 2023-11-01 NOTE — TELEPHONE ENCOUNTER
Caller: Kalli Lemons    Relationship: Self    Best call back number:  [phone]     What orders are you requesting (i.e. lab or imaging): CT LUNG SCANS    In what timeframe would the patient need to come in: NOVEMBER 20 TH (SAME DAY AS MAMMOGRAM)    Where will you receive your lab/imaging services: IN HOUSE    Additional notes: PATIENT STATED TIERA SAUCEDA ADVISED HER SHE NEEDS CT CHEST. IF ALREADY HAS ONE, PLEASE TRY TO SCHEDULE ON 11/20/23, SAME DAY AS HER MAMMOGRAM.  PLEASE CALL PATIENT TO SCHEDULE.

## 2023-11-03 NOTE — TELEPHONE ENCOUNTER
See message below and please get sent to the people who schedule them. I put the order in for the CT scan on 7/7/23. Thanks

## 2023-11-08 RX ORDER — ATORVASTATIN CALCIUM 10 MG/1
10 TABLET, FILM COATED ORAL DAILY
Qty: 30 TABLET | Refills: 0 | Status: SHIPPED | OUTPATIENT
Start: 2023-11-08

## 2023-11-22 DIAGNOSIS — I25.10 CORONARY ARTERY CALCIFICATION: Primary | ICD-10-CM

## 2023-11-22 DIAGNOSIS — I25.84 CORONARY ARTERY CALCIFICATION: Primary | ICD-10-CM

## 2023-11-28 ENCOUNTER — OFFICE VISIT (OUTPATIENT)
Dept: CARDIOLOGY | Facility: CLINIC | Age: 58
End: 2023-11-28
Payer: COMMERCIAL

## 2023-11-28 VITALS
OXYGEN SATURATION: 96 % | RESPIRATION RATE: 16 BRPM | WEIGHT: 155 LBS | HEART RATE: 78 BPM | HEIGHT: 62 IN | SYSTOLIC BLOOD PRESSURE: 128 MMHG | BODY MASS INDEX: 28.52 KG/M2 | DIASTOLIC BLOOD PRESSURE: 80 MMHG

## 2023-11-28 DIAGNOSIS — I25.10 CORONARY ARTERY CALCIFICATION SEEN ON CT SCAN: Primary | ICD-10-CM

## 2023-11-28 DIAGNOSIS — R20.0 BILATERAL LEG NUMBNESS: ICD-10-CM

## 2023-11-28 DIAGNOSIS — E78.00 PURE HYPERCHOLESTEROLEMIA: ICD-10-CM

## 2023-11-28 DIAGNOSIS — Z72.0 SMOKING TRYING TO QUIT: ICD-10-CM

## 2023-11-28 DIAGNOSIS — R06.02 SHORTNESS OF BREATH: ICD-10-CM

## 2023-11-28 PROBLEM — E78.2 MIXED HYPERLIPIDEMIA: Status: RESOLVED | Noted: 2022-06-30 | Resolved: 2023-11-28

## 2023-11-28 PROCEDURE — 99204 OFFICE O/P NEW MOD 45 MIN: CPT | Performed by: INTERNAL MEDICINE

## 2023-11-28 PROCEDURE — 93000 ELECTROCARDIOGRAM COMPLETE: CPT | Performed by: INTERNAL MEDICINE

## 2023-11-28 RX ORDER — ROSUVASTATIN CALCIUM 5 MG/1
5 TABLET, COATED ORAL DAILY
Qty: 30 TABLET | Refills: 11 | Status: SHIPPED | OUTPATIENT
Start: 2023-11-28

## 2023-11-28 NOTE — ASSESSMENT & PLAN NOTE
Lipid abnormalities are newly identified.  Nutritional counseling was provided., Pharmacotherapy as ordered., and trial of rosuvastatin in view of atorvastatin allergy/intolerance.  Lipids will be reassessed in 3 months.

## 2023-11-28 NOTE — ASSESSMENT & PLAN NOTE
Patient is a smoker.  Complaining of exertional dyspnea, in the setting of risk factors (coronary calcifications, hypercholesterolemia), could be an anginal equivalent symptom.  Patient had normal stress test and a normal echo 2 years and half ago.  Blood pressure to target.  Plan to repeat a treadmill nuclear stress test in the setting of CAD on CT chest and possible anginal equivalency  Intolerant to Lipitor, so trial of Crestor 5 mg 1 tab daily  If intolerant to statin we will proceed with ezetimibe as a next line agent  If work-up negative, consider full PFTs rule out COPD/emphysema

## 2023-11-28 NOTE — PROGRESS NOTES
[facility]  [facility] CARDIOLOGY  [address] [place] [address]  Dept: [phone]  Dept Fax: [phone]    Date: 11/28/2023  Patient: Kalli Lemons  YOB: 1965    New Patient Office Note    Consult Reason:  Ms. Kalli Lemons is a 58 y.o. female who presents to the clinic to establish care, seen for Coronary calcifications and Shortness of Breath.   Patient denies angina, orthopnea, PND, palpitations, lightheadedness, syncope.  Patient admits weakness and fatigue on Lipitor, so stopped it.  Patient admits exertional dyspnea after moderate daily activities, worse over the last 2 years.  Recently, she had a CT chest done showing coronary calcifications.  She is referred for management.    The following portions of the patient's history were reviewed and updated as appropriate: allergies, current medications, past family history, past medical history, past social history, past surgical history, and problem list.    Medications:   Allergies   Allergen Reactions    Lipitor [Atorvastatin] Myalgia      Current Outpatient Medications   Medication Instructions    rosuvastatin (CRESTOR) 5 mg, Oral, Daily       Subjective  Past Medical History:   Diagnosis Date    Acute non-recurrent frontal sinusitis     Anxiety     BMI 30.0-30.9,adult     Cigarette nicotine dependence with nicotine-induced disorder     Cough     Depression     Extremity numbness     Fatigue     Irritable bowel syndrome with diarrhea     Polyuria        Past Surgical History:   Procedure Laterality Date    CHOLECYSTECTOMY      HYSTERECTOMY         Family History   Problem Relation Age of Onset    Cancer Brother     Breast cancer Neg Hx         Social History     Socioeconomic History    Marital status:    Tobacco Use    Smoking status: Some Days     Packs/day: 1.00     Years: 31.00     Additional pack years: 0.00     Total pack years: 31.00     Types: Cigarettes     Start date: 1988     "Smokeless tobacco: Never   Vaping Use    Vaping Use: Never used   Substance and Sexual Activity    Alcohol use: Never    Drug use: Yes     Types: Marijuana    Sexual activity: Defer       Objective  Vitals:    11/28/23 1058 11/28/23 1153   BP: 130/79 128/80   BP Location: Right arm Right arm   Patient Position: Sitting    Cuff Size: Adult    Pulse: 78    Resp: 16    SpO2: 96%    Weight: 70.3 kg (155 lb)    Height: 157.5 cm (62\")    PainSc: 0-No pain      Vitals:    11/28/23 1058 11/28/23 1153   BP: 130/79 128/80   BP Location: Right arm Right arm   Patient Position: Sitting    Cuff Size: Adult    Pulse: 78    Resp: 16    SpO2: 96%    Weight: 70.3 kg (155 lb)    Height: 157.5 cm (62\")         Physical Exam  Constitutional:       Appearance: Healthy appearance. Not in distress.   Eyes:      Pupils: Pupils are equal, round, and reactive to light.   HENT:    Mouth/Throat:      Mouth: Mucous membranes are moist.   Neck:      Vascular: No carotid bruit, hepatojugular reflux, JVD or JVR. JVD normal.   Pulmonary:      Effort: Pulmonary effort is normal.      Breath sounds: Normal breath sounds. No wheezing. No rhonchi. No rales.   Chest:      Chest wall: Not tender to palpatation.   Cardiovascular:      PMI at left midclavicular line. Normal rate. Regular rhythm. Normal S1 with normal intensity. Normal S2 with normal intensity.       Murmurs: There is no murmur.      No gallop.  No click. No rub.      Comments: Poor venous circulation  Pulses:     Carotid: 4+ bilaterally.     Radial: 4+ bilaterally.     Popliteal: 4+ bilaterally.     Dorsalis pedis: 2+ bilaterally.     Posterior tibial: 2+ bilaterally.  Edema:     Peripheral edema absent.   Abdominal:      General: There is no abdominal bruit.   Skin:     General: Skin is warm.   Neurological:      Mental Status: Alert and oriented to person, place and time.          Labs:  Lab Results   Component Value Date     07/07/2023    K 4.7 07/07/2023     07/07/2023    " "CO2 22 07/07/2023    MG 2.1 07/07/2023    BUN 12 07/07/2023    CREATININE 0.62 07/07/2023    CALCIUM 9.3 07/07/2023    BILITOT 0.4 07/07/2023    ALKPHOS 81 07/07/2023    ALT 11 07/07/2023    AST 13 07/07/2023    GLUCOSE 96 07/07/2023    ALBUMIN 4.4 07/07/2023     Lab Results   Component Value Date    WBC 12.1 (H) 07/07/2023    HGB 14.8 07/07/2023    HCT 44.5 07/07/2023     07/07/2023     No results found for: \"APTT\", \"INR\", \"PTT\"  No results found for: \"CKTOTAL\", \"TROPONINI\", \"TROPONINT\", \"CKMBINDEX\", \"BNP\"  No results found for: \"BNP\", \"PROBNP\"    Lab Results   Component Value Date    CHLPL 200 (H) 07/07/2023    TRIG 108 07/07/2023    HDL 55 07/07/2023     (H) 07/07/2023     Lab Results   Component Value Date    TSH 1.460 07/07/2023       The ASCVD Risk score (Simeon DK, et al., 2019) failed to calculate for the following reasons:    The patient has a prior MI or stroke diagnosis     CV Diagnostics:    ECG 12 Lead    Date/Time: 11/28/2023 11:25 AM  Performed by: Parveen He MD    Authorized by: Parveen He MD  Comparison: not compared with previous ECG   Rhythm: sinus rhythm    Clinical impression: normal ECG          ECHO/MUGA: Results for orders placed in visit on 04/13/21    Treadmill Stress Test    Interpretation Summary  · Normal stress treadmill EKG, negative for ischemia     STRESS TESTS: No results found for this or any previous visit.     CARDIAC CATH: No results found for this or any previous visit.     DEVICES: No valid procedures specified.   HOLTER: No results found for this or any previous visit.     CT/MRI:  No results found for this or any previous visit.    VASCULAR: No valid procedures specified.     Assessment and Plan  Diagnoses and all orders for this visit:    1. Coronary artery calcification seen on CT scan (Primary)  Assessment & Plan:  Marker of CAD.  Patient with hypercholesterolemia.  Angina equivalent possible by symptoms.  Referred for treadmill nuclear stress " test  Trial of Crestor 5 mg 1 tab daily in view of Lipitor allergy    Orders:  -     ECG 12 Lead  -     Stress Test With Myocardial Perfusion One Day; Future    2. Shortness of breath  Assessment & Plan:  Patient is a smoker.  Complaining of exertional dyspnea, in the setting of risk factors (coronary calcifications, hypercholesterolemia), could be an anginal equivalent symptom.  Patient had normal stress test and a normal echo 2 years and half ago.  Blood pressure to target.  Plan to repeat a treadmill nuclear stress test in the setting of CAD on CT chest and possible anginal equivalency  Intolerant to Lipitor, so trial of Crestor 5 mg 1 tab daily  If intolerant to statin we will proceed with ezetimibe as a next line agent  If work-up negative, consider full PFTs rule out COPD/emphysema      3. Pure hypercholesterolemia  Assessment & Plan:  Lipid abnormalities are newly identified.  Nutritional counseling was provided., Pharmacotherapy as ordered., and trial of rosuvastatin in view of atorvastatin allergy/intolerance.  Lipids will be reassessed in 3 months.    Orders:  -     Stress Test With Myocardial Perfusion One Day; Future  -     rosuvastatin (CRESTOR) 5 MG tablet; Take 1 tablet by mouth Daily.  Dispense: 30 tablet; Refill: 11    4. Bilateral leg numbness  Assessment & Plan:  Patient with CAD on chest CT.  Decreased pulses on exam.  Symptoms of numbness, not cramping, mainly at the end of the day.  Patient with poor venous circulation, likely contributing to symptoms.  SVETLANA rule out PVD  Statin therapy  Smoking cessation    Orders:  -     Doppler Ankle Brachial Index Single Level CAR; Future    5. Smoking trying to quit  Assessment & Plan:  Patient at half pack daily.  Discussed with patient extensively about risk of progression of CAD (MI, stroke, PVD) as well as risk of cancer.  Patient understands and willing to quit.  Trying on her own by weaning herself off progressively.  Follow-up on smoking cessation  process  Consider medical therapy/nicotine replacement therapy next visit if unsuccessful           Return in about 2 months (around 1/28/2024) for follow-up clinically and on any tests ordered.    Parveen He MD

## 2023-11-28 NOTE — ASSESSMENT & PLAN NOTE
Patient with CAD on chest CT.  Decreased pulses on exam.  Symptoms of numbness, not cramping, mainly at the end of the day.  Patient with poor venous circulation, likely contributing to symptoms.  SVETLANA rule out PVD  Statin therapy  Smoking cessation

## 2023-11-28 NOTE — ASSESSMENT & PLAN NOTE
Patient at half pack daily.  Discussed with patient extensively about risk of progression of CAD (MI, stroke, PVD) as well as risk of cancer.  Patient understands and willing to quit.  Trying on her own by weaning herself off progressively.  Follow-up on smoking cessation process  Consider medical therapy/nicotine replacement therapy next visit if unsuccessful

## 2023-11-28 NOTE — ASSESSMENT & PLAN NOTE
Marker of CAD.  Patient with hypercholesterolemia.  Angina equivalent possible by symptoms.  Referred for treadmill nuclear stress test  Trial of Crestor 5 mg 1 tab daily in view of Lipitor allergy

## 2023-12-05 ENCOUNTER — PATIENT ROUNDING (BHMG ONLY) (OUTPATIENT)
Dept: CARDIOLOGY | Facility: CLINIC | Age: 58
End: 2023-12-05
Payer: COMMERCIAL

## 2023-12-05 NOTE — PROGRESS NOTES
December 5, 2023    Hello, may I speak with Kalli Lemons?    My name is CANDELARIA      I am  with [facility]  [facility] CARDIOLOGY  [address] [place] [address].    Before we get started may I verify your date of birth? 1965    I am calling to officially welcome you to our practice and ask about your recent visit. Is this a good time to talk? yes    Tell me about your visit with us. What things went well?  ALL WENT WELL, HE ASKED SYMPTOMS AND QUESTIONS, BROUGHT REALIZATION TO HER SYMPTOMS - MORE TO IT THAN HER ORIGINAL THOUGHTS        We're always looking for ways to make our patients' experiences even better. Do you have recommendations on ways we may improve?  no - EVERYONE PROFESSIONAL, FRIENDLY, TIME EFFICIENT     Overall were you satisfied with your first visit to our practice? yes       I appreciate you taking the time to speak with me today. Is there anything else I can do for you? NO       Thank you, and have a great day.

## 2023-12-19 ENCOUNTER — TELEPHONE (OUTPATIENT)
Dept: CARDIOLOGY | Facility: CLINIC | Age: 58
End: 2023-12-19

## 2023-12-19 NOTE — TELEPHONE ENCOUNTER
----- Message from Parveen He MD sent at 12/19/2023  1:47 PM EST -----  Please let patient know her SVETLANA was normal.

## 2023-12-20 ENCOUNTER — TELEPHONE (OUTPATIENT)
Dept: CARDIOLOGY | Facility: CLINIC | Age: 58
End: 2023-12-20
Payer: COMMERCIAL

## 2023-12-20 NOTE — TELEPHONE ENCOUNTER
Spoke with pt and let patient know that the stress test was normal.  Nonetheless her exercise Duke treadmill score was medium risk so recommendation to increase her exercise capacity by exercising daily 30 minutes low to moderate intensity, losing weight, and heart healthy diet.

## 2023-12-20 NOTE — TELEPHONE ENCOUNTER
----- Message from Parveen He MD sent at 12/19/2023  4:50 PM EST -----  Please let patient know that the stress test was normal.  Nonetheless her exercise Duke treadmill score was medium risk so recommendation to increase her exercise capacity by exercising daily 30 minutes low to moderate intensity, losing weight, and heart healthy diet.

## 2024-01-26 ENCOUNTER — OFFICE VISIT (OUTPATIENT)
Dept: CARDIOLOGY | Facility: CLINIC | Age: 59
End: 2024-01-26
Payer: COMMERCIAL

## 2024-01-26 VITALS
WEIGHT: 162 LBS | SYSTOLIC BLOOD PRESSURE: 126 MMHG | OXYGEN SATURATION: 99 % | TEMPERATURE: 98 F | DIASTOLIC BLOOD PRESSURE: 80 MMHG | HEART RATE: 73 BPM | BODY MASS INDEX: 29.81 KG/M2 | HEIGHT: 62 IN | RESPIRATION RATE: 16 BRPM

## 2024-01-26 DIAGNOSIS — E78.00 PURE HYPERCHOLESTEROLEMIA: ICD-10-CM

## 2024-01-26 DIAGNOSIS — Z72.0 TOBACCO USE: ICD-10-CM

## 2024-01-26 DIAGNOSIS — I25.10 CORONARY ARTERY CALCIFICATION SEEN ON CT SCAN: Primary | ICD-10-CM

## 2024-01-26 DIAGNOSIS — R06.02 SHORTNESS OF BREATH: ICD-10-CM

## 2024-01-26 DIAGNOSIS — Z72.0 SMOKING TRYING TO QUIT: ICD-10-CM

## 2024-01-26 NOTE — ASSESSMENT & PLAN NOTE
At baseline.  Patient is a smoker.  Complaining of exertional dyspnea, in the setting of risk factors (coronary calcifications, hypercholesterolemia), could be an anginal equivalent symptom.  Patient had normal stress test and a normal echo 2 years and half ago.  Repeat treadmill nuclear stress test negative as well.  Blood pressure to target.  Plan:  Repeat lipid profile   Continue Crestor 5 mg 1 tab daily for now since well-tolerated   Symptoms stable, trying to quit the cigarette.  Can consider full PFTs rule out COPD/emphysema as per PCP  Encouraged T-minute exercise daily, weight loss, heart healthy low-salt low-cholesterol diet.

## 2024-01-26 NOTE — ASSESSMENT & PLAN NOTE
Patch is not helping, makes her want to smoke.  Gums difficult on her teeth.  Lozenges do not taste very good.  Patient having hard time quitting while indoors.  She had made plans to quit in the spring.  Encourage patient to set a date, and start planning for D - day from now.  Patient understands and agreeable.

## 2024-01-26 NOTE — ASSESSMENT & PLAN NOTE
Suggestive of CAD.  Patient with hypercholesterolemia.  Treadmill nuclear stress test negative, nonetheless moderate risk by Rojas's criteria.   Plan:  Crestor 5 mg appears to be well-tolerated  Repeat lipid profile sometimes in the next coming weeks; nonfasting today

## 2024-01-26 NOTE — PROGRESS NOTES
"[facility]  [facility] CARDIOLOGY  [address] [place] [address]  Dept: [phone]  Dept Fax: [phone]    Date: 01/26/2024  Patient: Kalli Lemons  YOB: 1965    Follow Up Office Visit Note    Interval Follow-up  Ms. Kalli Lemons is a 59 y.o. female who is here for follow-up on Coronary Artery Disease.    Subjective   Patient denies angina, orthopnea, PND, palpitations, lightheadedness, syncope or medications side-effects.  Patient still smoking trying to quit.    The following portions of the patient's history were reviewed and updated as appropriate: allergies, current medications, past family history, past medical history, past social history, past surgical history, and problem list.    Medications:   Allergies   Allergen Reactions    Lipitor [Atorvastatin] Myalgia      Current Outpatient Medications   Medication Instructions    rosuvastatin (CRESTOR) 5 mg, Oral, Daily       Tobacco Use: High Risk (1/26/2024)    Patient History     Smoking Tobacco Use: Some Days     Smokeless Tobacco Use: Never     Passive Exposure: Not on file        Objective  Vitals:    01/26/24 1018   BP: 126/80   BP Location: Right arm   Patient Position: Lying   Cuff Size: Adult   Pulse: 73   Resp: 16   Temp: 98 °F (36.7 °C)   TempSrc: Infrared   SpO2: 99%   Weight: 73.5 kg (162 lb)   Height: 157 cm (61.81\")   PainSc: 0-No pain      Vitals:    01/26/24 1018   BP: 126/80   BP Location: Right arm   Patient Position: Lying   Cuff Size: Adult   Pulse: 73   Resp: 16   Temp: 98 °F (36.7 °C)   TempSrc: Infrared   SpO2: 99%   Weight: 73.5 kg (162 lb)   Height: 157 cm (61.81\")          Physical Exam  Constitutional:       Appearance: Not in distress.   Neck:      Vascular: JVD normal.   Cardiovascular:      Normal rate. Regular rhythm.      Murmurs: There is no murmur.      Comments: Trace bilateral lower extremity edema with poor circulation, nonpitting  Pulses:     Intact distal " "pulses.   Edema:     Peripheral edema present.     Feet: bilateral trace edema of the feet.  Neurological:      Mental Status: Alert.          Diagnostic Data  Lab Results   Component Value Date     07/07/2023    K 4.7 07/07/2023     07/07/2023    CO2 22 07/07/2023    MG 2.1 07/07/2023    BUN 12 07/07/2023    CREATININE 0.62 07/07/2023    CALCIUM 9.3 07/07/2023    BILITOT 0.4 07/07/2023    ALKPHOS 81 07/07/2023    ALT 11 07/07/2023    AST 13 07/07/2023    GLUCOSE 96 07/07/2023    ALBUMIN 4.4 07/07/2023     Lab Results   Component Value Date    WBC 12.1 (H) 07/07/2023    HGB 14.8 07/07/2023    HCT 44.5 07/07/2023     07/07/2023     No results found for: \"APTT\", \"INR\", \"PTT\"  No results found for: \"CKTOTAL\", \"TROPONINI\", \"TROPONINT\", \"CKMBINDEX\", \"BNP\"  No results found for: \"BNP\", \"PROBNP\"    Lab Results   Component Value Date    CHLPL 200 (H) 07/07/2023    TRIG 108 07/07/2023    HDL 55 07/07/2023     (H) 07/07/2023     Lab Results   Component Value Date    TSH 1.460 07/07/2023       CV Diagnostics:  Procedures    CXR: No results found for this or any previous visit.     ECHO/MUGA: Results for orders placed in visit on 04/13/21    Adult Transthoracic Echo Complete W/ Cont if Necessary Per Protocol    Interpretation Summary  · Estimated left ventricular EF was in agreement with the calculated left ventricular EF. Left ventricular ejection fraction appears to be 61 - 65%. Left ventricular systolic function is normal.  · Left ventricular diastolic function was normal.  · Estimated right ventricular systolic pressure from tricuspid regurgitation is normal (<35 mmHg).  · Normal valves  · No pericardial effussion     STRESS TESTS: Results for orders placed in visit on 12/19/23    Stress Test With Myocardial Perfusion One Day    Interpretation Summary    Impressions are consistent with a low risk treadmill nuclear stress test - normal study.    Myocardial perfusion imaging indicates a normal stress " myocardial perfusion with no evidence of ischemia.    Left ventricular ejection fraction is hyperdynamic (Calculated EF > 70%).    Moderate risk for ischemic heart disease.    Electrocardiographically, findings consistent with an indeterminate ECG stress test with a Duke treadmill score of +4 (medium risk). No significant ST-T changes during stress with return to baseline after 2 to 5 minutes in recovery.    Breast attenuation artifact is present.     CARDIAC CATH: No results found for this or any previous visit.     DEVICES: No valid procedures specified.   HOLTER: No results found for this or any previous visit.     CT/MRI:  No results found for this or any previous visit.    VASCULAR: No valid procedures specified.     Assessment and Plan  Diagnoses and all orders for this visit:    1. Coronary artery calcification seen on CT scan (Primary)  Assessment & Plan:  Suggestive of CAD.  Patient with hypercholesterolemia.  Treadmill nuclear stress test negative, nonetheless moderate risk by Rojas's criteria.   Plan:  Crestor 5 mg appears to be well-tolerated  Repeat lipid profile sometimes in the next coming weeks; nonfasting today      2. Shortness of breath  Assessment & Plan:  At baseline.  Patient is a smoker.  Complaining of exertional dyspnea, in the setting of risk factors (coronary calcifications, hypercholesterolemia), could be an anginal equivalent symptom.  Patient had normal stress test and a normal echo 2 years and half ago.  Repeat treadmill nuclear stress test negative as well.  Blood pressure to target.  Plan:  Repeat lipid profile   Continue Crestor 5 mg 1 tab daily for now since well-tolerated   Symptoms stable, trying to quit the cigarette.  Can consider full PFTs rule out COPD/emphysema as per PCP  Encouraged T-minute exercise daily, weight loss, heart healthy low-salt low-cholesterol diet.      3. Pure hypercholesterolemia  Assessment & Plan:  Lipid abnormalities are improving with  treatment.  Nutritional counseling was provided. and Pharmacotherapy as ordered.  Lipids will be reassessed  in the next few weeks .      4. Smoking trying to quit  Assessment & Plan:  Patch is not helping, makes her want to smoke.  Gums difficult on her teeth.  Lozenges do not taste very good.  Patient having hard time quitting while indoors.  She had made plans to quit in the spring.  Encourage patient to set a date, and start planning for D - day from now.  Patient understands and agreeable.           Return in about 6 months (around 7/26/2024) for Follow-up with Dr He.    There are no Patient Instructions on file for this visit.    Parveen He MD

## 2024-01-26 NOTE — ASSESSMENT & PLAN NOTE
Lipid abnormalities are improving with treatment.  Nutritional counseling was provided. and Pharmacotherapy as ordered.  Lipids will be reassessed  in the next few weeks .

## 2024-01-31 ENCOUNTER — CLINICAL SUPPORT (OUTPATIENT)
Dept: CARDIOLOGY | Facility: CLINIC | Age: 59
End: 2024-01-31
Payer: COMMERCIAL

## 2024-01-31 DIAGNOSIS — E78.00 PURE HYPERCHOLESTEROLEMIA: Primary | ICD-10-CM

## 2024-01-31 NOTE — PROGRESS NOTES
Venipuncture Blood Specimen Collection  Venipuncture performed in clinic by Migdalia Chavez MA with good hemostasis. Patient tolerated the procedure well without complications.   01/31/24   Migdalia Chavez MA

## 2024-02-01 LAB
CHOLEST SERPL-MCNC: 150 MG/DL (ref 100–199)
HDLC SERPL-MCNC: 55 MG/DL
LDLC SERPL CALC-MCNC: 74 MG/DL (ref 0–99)
TRIGL SERPL-MCNC: 116 MG/DL (ref 0–149)
VLDLC SERPL CALC-MCNC: 21 MG/DL (ref 5–40)

## 2024-02-02 ENCOUNTER — TELEPHONE (OUTPATIENT)
Dept: CARDIOLOGY | Facility: CLINIC | Age: 59
End: 2024-02-02
Payer: COMMERCIAL

## 2024-02-02 NOTE — TELEPHONE ENCOUNTER
----- Message from Parveen He MD sent at 2/1/2024  5:19 PM EST -----  Please inform patient that her lipid profile was to goal.  It sounds like Crestor low-dose is working well.  Please check with patient if any side effects.  Thank you!

## 2024-02-02 NOTE — TELEPHONE ENCOUNTER
Spoke with patient over the phone and let her know that her lipid profile was to goal,and that  It sounds like Crestor low-dose is working well.  I asked PT if she is having any symptoms, and she stated she is a bit tired, however she isn't sure if it is due to the weather being gloomy, and after we have a few nice yancy days, and she isn't any better she will call us and let us know. I told PT that is understandable and okay.     PT verbalized understanding about her lab work.

## 2024-05-13 ENCOUNTER — READMISSION MANAGEMENT (OUTPATIENT)
Dept: CALL CENTER | Facility: HOSPITAL | Age: 59
End: 2024-05-13
Payer: COMMERCIAL

## 2024-05-13 ENCOUNTER — TRANSITIONAL CARE MANAGEMENT TELEPHONE ENCOUNTER (OUTPATIENT)
Dept: CALL CENTER | Facility: HOSPITAL | Age: 59
End: 2024-05-13
Payer: COMMERCIAL

## 2024-05-13 ENCOUNTER — TELEPHONE (OUTPATIENT)
Dept: FAMILY MEDICINE CLINIC | Facility: CLINIC | Age: 59
End: 2024-05-13

## 2024-05-13 NOTE — OUTREACH NOTE
Call Center TCM Note      Flowsheet Row Responses   [facility] patient discharged from? Non-BH   Does the patient have one of the following disease processes/diagnoses(primary or secondary)? Other   TCM attempt successful? Yes   Call start time 1207   Discharge diagnosis fall   Person spoke with today (if not patient) and relationship patient   Meds reviewed with patient/caregiver? Yes   Is the patient having any side effects they believe may be caused by any medication additions or changes? No   Does the patient have all medications ordered at discharge? Yes   Is the patient taking all medications as directed (includes completed medication regime)? Yes   Comments Patient has a hospital followup scheduled on 5/15/2024 with Jacques Lord. She was hospitalized at [facility] for a fall. She is very sore thru her ribs and has pain at back of neck.   Does the patient have an appointment with their PCP within 7-14 days of discharge? Yes   Psychosocial issues? No   Did the patient receive a copy of their discharge instructions? Yes   Nursing interventions Reviewed instructions with patient   What is the patient's perception of their health status since discharge? Improving   Is the patient/caregiver able to teach back signs and symptoms related to disease process for when to call PCP? Yes   Is the patient/caregiver able to teach back signs and symptoms related to disease process for when to call 911? Yes   Is the patient/caregiver able to teach back the hierarchy of who to call/visit for symptoms/problems? PCP, Specialist, Home health nurse, Urgent Care, ED, 911 Yes   TCM call completed? Yes   Would this patient benefit from a Referral to Amb Social Work? No   Is the patient interested in additional calls from an ambulatory ? No            Sánchez Barry RN    5/13/2024, 12:20 EDT

## 2024-05-13 NOTE — TELEPHONE ENCOUNTER
Caller: Kalli Lemons    Relationship to patient: Self    Best call back number: [phone]     New or established patient?  [] New  [x] Established    Date of discharge: 4/9/24    Facility discharged from: [facility]    Diagnosis/Symptoms: FALL    Length of stay (If applicable):     Specialty Only: Did you see a [facility] health provider?    [] Yes  [x] No  If so, who?

## 2024-05-13 NOTE — OUTREACH NOTE
Prep Survey      Flowsheet Row Responses   [facility] facility patient discharged from? Non-BH   Is LACE score < 7 ? Non-BH Discharge   Eligibility [facility]   Date of Discharge 05/09/24   Discharge Disposition Home or Self Care   Discharge diagnosis fall   Does the patient have one of the following disease processes/diagnoses(primary or secondary)? Other   Prep survey completed? Yes            Claudia HORN - Registered Nurse

## 2024-05-15 ENCOUNTER — TELEPHONE (OUTPATIENT)
Dept: FAMILY MEDICINE CLINIC | Facility: CLINIC | Age: 59
End: 2024-05-15

## 2024-05-15 ENCOUNTER — OFFICE VISIT (OUTPATIENT)
Dept: FAMILY MEDICINE CLINIC | Facility: CLINIC | Age: 59
End: 2024-05-15
Payer: COMMERCIAL

## 2024-05-15 VITALS
HEIGHT: 62 IN | BODY MASS INDEX: 30.18 KG/M2 | HEART RATE: 67 BPM | OXYGEN SATURATION: 98 % | DIASTOLIC BLOOD PRESSURE: 88 MMHG | SYSTOLIC BLOOD PRESSURE: 138 MMHG | WEIGHT: 164 LBS

## 2024-05-15 DIAGNOSIS — M25.562 ACUTE PAIN OF LEFT KNEE: ICD-10-CM

## 2024-05-15 DIAGNOSIS — R35.0 URINARY FREQUENCY: ICD-10-CM

## 2024-05-15 DIAGNOSIS — M25.512 ACUTE PAIN OF LEFT SHOULDER: Primary | ICD-10-CM

## 2024-05-15 DIAGNOSIS — M54.2 NECK PAIN: ICD-10-CM

## 2024-05-15 LAB
BILIRUB BLD-MCNC: NEGATIVE MG/DL
CLARITY, POC: CLEAR
COLOR UR: YELLOW
EXPIRATION DATE: ABNORMAL
GLUCOSE UR STRIP-MCNC: NEGATIVE MG/DL
KETONES UR QL: NEGATIVE
LEUKOCYTE EST, POC: NEGATIVE
Lab: ABNORMAL
NITRITE UR-MCNC: POSITIVE MG/ML
PH UR: 6 [PH] (ref 5–8)
PROT UR STRIP-MCNC: NEGATIVE MG/DL
RBC # UR STRIP: ABNORMAL /UL
SP GR UR: 1.01 (ref 1–1.03)
UROBILINOGEN UR QL: ABNORMAL

## 2024-05-15 PROCEDURE — 99214 OFFICE O/P EST MOD 30 MIN: CPT | Performed by: NURSE PRACTITIONER

## 2024-05-15 PROCEDURE — 81003 URINALYSIS AUTO W/O SCOPE: CPT | Performed by: NURSE PRACTITIONER

## 2024-05-15 RX ORDER — PREDNISONE 20 MG/1
TABLET ORAL
Qty: 18 TABLET | Refills: 0 | Status: SHIPPED | OUTPATIENT
Start: 2024-05-15 | End: 2024-05-24

## 2024-05-15 RX ORDER — IBUPROFEN 600 MG/1
TABLET ORAL
COMMUNITY
Start: 2024-04-30 | End: 2024-05-15

## 2024-05-15 RX ORDER — NITROFURANTOIN 25; 75 MG/1; MG/1
100 CAPSULE ORAL 2 TIMES DAILY
Qty: 10 CAPSULE | Refills: 0 | Status: SHIPPED | OUTPATIENT
Start: 2024-05-15

## 2024-05-15 RX ORDER — CYCLOBENZAPRINE HCL 10 MG
10 TABLET ORAL 3 TIMES DAILY PRN
Qty: 20 TABLET | Refills: 0 | Status: SHIPPED | OUTPATIENT
Start: 2024-05-15

## 2024-05-15 NOTE — TELEPHONE ENCOUNTER
Caller: Kalli Lemons    Relationship: Self    Best call back number: [phone]     What is the medical concern/diagnosis: SHOULDER AND KNEE INJURY    What specialty or service is being requested: ORTHOPEDIC    What is the provider, practice or medical service name: ORTHOPEDIC    What is the office location: ?    What is the office phone number: ?    Any additional details: PT WAS ASKING IF THE MRI IS FOR BOTH THE KNEE AND SHOULDER AND BACK OR JUST THE KNEE.  PT WOULD LIKE IT TO BE FOR ALL 3 DUE TO X-RAYS NOT SHOWING SOFT TISSUE DAMAGE.

## 2024-05-15 NOTE — ASSESSMENT & PLAN NOTE
X-ray completed.  Will let know if radiologist sees anything.  Prednisone as prescribed.  Avoid NSAIDs while on prednisone.  May continue with Tylenol as needed.  RICE encouraged. Will place orthopedics referral for further evaluation.  Go to ED if worsens.  Risk of meds discussed and understood.  Return to clinic or ED with any issues or concerns.

## 2024-05-15 NOTE — ASSESSMENT & PLAN NOTE
Will order MRI.  Informed patient to call afterwards for results.  Patient states that she has never been told not to have an MRI.  States no metal in her body. Prednisone as prescribed.  Avoid NSAIDs while on prednisone.  May continue with Tylenol as needed.  RICE encouraged. Will place orthopedics referral for further evaluation.  Go to ED if worsens.  Risk of meds discussed and understood.  Return to clinic or ED with any issues or concerns.

## 2024-05-16 ENCOUNTER — TELEPHONE (OUTPATIENT)
Dept: FAMILY MEDICINE CLINIC | Facility: CLINIC | Age: 59
End: 2024-05-16
Payer: COMMERCIAL

## 2024-05-16 NOTE — TELEPHONE ENCOUNTER
HUB TO RELAY    Lvm.   MRI is just for her knee.  Insurance likely would not approve all those MRIs.  Recommend she follow-up with orthopedics and they can discuss necessary imaging further.  Thanks

## 2024-05-16 NOTE — TELEPHONE ENCOUNTER
Hub staff attempted to follow warm transfer process and was unsuccessful     Caller: Kalli Lemons    Relationship to patient: Self    Best call back number: [phone]    Patient is needing: PATIENT RETURNED A CALL FROM [place].

## 2024-05-16 NOTE — TELEPHONE ENCOUNTER
MRI is just for her knee.  Insurance likely would not approve all those MRIs.  Recommend she follow-up with orthopedics and they can discuss necessary imaging further.  Thanks

## 2024-05-17 NOTE — TELEPHONE ENCOUNTER
Name: Kalli Lemons    Relationship: Self    Best Callback Number: [phone]     HUB PROVIDED THE RELAY MESSAGE FROM THE OFFICE   PATIENT VOICED UNDERSTANDING AND HAS NO FURTHER QUESTIONS AT THIS TIME    Babs Perkins MA Medical Assistant Signed Yesterday       HUB TO RELAY     Lvm.   MRI is just for her knee.  Insurance likely would not approve all those MRIs.  Recommend she follow-up with orthopedics and they can discuss necessary imaging further.  Thanks

## 2024-05-18 LAB
BACTERIA UR CULT: ABNORMAL
OTHER ANTIBIOTIC SUSC ISLT: ABNORMAL

## 2024-05-29 ENCOUNTER — TELEPHONE (OUTPATIENT)
Dept: FAMILY MEDICINE CLINIC | Facility: CLINIC | Age: 59
End: 2024-05-29
Payer: COMMERCIAL

## 2024-05-29 NOTE — TELEPHONE ENCOUNTER
Caller: Kalli Lemons    Relationship: Self    Best call back number: [phone]    What is the best time to reach you: ANYTIME    Who are you requesting to speak with (clinical staff, provider,  specific staff member): REFERRAL COORDINATOR    What was the call regarding: PATIENT STATES THAT SHE WOULD THE REFERRAL FOR ORTHOPEDIC SURGERY TO BE LOCATED IN [place]    Is it okay if the provider responds through MyChart: NO

## 2024-06-04 ENCOUNTER — TELEPHONE (OUTPATIENT)
Dept: FAMILY MEDICINE CLINIC | Facility: CLINIC | Age: 59
End: 2024-06-04
Payer: COMMERCIAL

## 2024-06-06 ENCOUNTER — TELEPHONE (OUTPATIENT)
Dept: FAMILY MEDICINE CLINIC | Facility: CLINIC | Age: 59
End: 2024-06-06
Payer: COMMERCIAL

## 2024-06-11 ENCOUNTER — TELEPHONE (OUTPATIENT)
Dept: ORTHOPEDIC SURGERY | Facility: CLINIC | Age: 59
End: 2024-06-11
Payer: COMMERCIAL

## 2024-06-11 ENCOUNTER — OFFICE VISIT (OUTPATIENT)
Dept: ORTHOPEDIC SURGERY | Facility: CLINIC | Age: 59
End: 2024-06-11
Payer: COMMERCIAL

## 2024-06-11 VITALS
DIASTOLIC BLOOD PRESSURE: 84 MMHG | SYSTOLIC BLOOD PRESSURE: 158 MMHG | HEIGHT: 62 IN | WEIGHT: 160 LBS | BODY MASS INDEX: 29.44 KG/M2

## 2024-06-11 DIAGNOSIS — M25.562 LEFT KNEE PAIN, UNSPECIFIED CHRONICITY: Primary | ICD-10-CM
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
## 2024-06-11 DIAGNOSIS — S83.412A SPRAIN OF MEDIAL COLLATERAL LIGAMENT OF LEFT KNEE, INITIAL ENCOUNTER: ICD-10-CM

## 2024-06-11 DIAGNOSIS — S72.415A: ICD-10-CM

## 2024-06-11 PROCEDURE — 99204 OFFICE O/P NEW MOD 45 MIN: CPT | Performed by: STUDENT IN AN ORGANIZED HEALTH CARE EDUCATION/TRAINING PROGRAM

## 2024-06-11 NOTE — TELEPHONE ENCOUNTER
Patient was just seen at [facility] and would like to exchange for another knee brace. Patient stated that the knee brace is not supporting her knee how it needs to. Please advise.     [phone]

## 2024-06-11 NOTE — PROGRESS NOTES
[facility] [place]     Office Visit       Date: 06/11/2024   Patient Name: Kalli Lemons  MRN: [id number]  YOB: 1965    Referring Physician: Jacques Lord AP*     Chief Complaint:   Chief Complaint   Patient presents with   • Left Knee - Pain       History of Present Illness:   Kalli Lemons is a 59 y.o. female who presents with left knee pain for 1 month(s). Onset direct blow. Pain is localized to the medial joint line and is a 7/10 on the pain scale. Pain is described as dull, aching, burning, throbbing, stabbing, and shooting. Associated symptoms include pain, swelling, popping, and giving way/buckling. The pain is worse with standing, leisure, and lying on affected side; ice and heat make it better. Previous treatments have included: bracing, cane/walker, NSAIDS, and steroid injection (last injection 5/15/24) since symptom onset. Although some transient relief was reported with these interventions, these conservative measures have failed and symptoms have persisted. The patient is limited in daily activities and has had a significant decrease in quality of life as a result. She denies  fever or chills.    Subjective   Review of Systems: Review of Systems   Constitutional:  Negative for chills, fever, unexpected weight gain and unexpected weight loss.   HENT:  Negative for congestion, postnasal drip and rhinorrhea.    Eyes:  Negative for blurred vision.   Respiratory:  Negative for shortness of breath.    Cardiovascular:  Negative for leg swelling.   Gastrointestinal:  Negative for abdominal pain, nausea and vomiting.   Genitourinary:  Negative for difficulty urinating.   Musculoskeletal:  Positive for arthralgias. Negative for gait problem, joint swelling and myalgias.   Skin:  Negative for skin lesions and wound.   Neurological:  Negative for dizziness, weakness, light-headedness and numbness.   Hematological:  Does not  "bruise/bleed easily.   Psychiatric/Behavioral:  Negative for depressed mood.         I have reviewed the following portions of the patient's history:History of Present Illness and review of systems.    Past Medical History:   Past Medical History:   Diagnosis Date   • Acute non-recurrent frontal sinusitis    • Anxiety    • BMI 30.0-30.9,adult    • Cigarette nicotine dependence with nicotine-induced disorder    • Cough    • Depression    • Extremity numbness    • Fatigue    • Irritable bowel syndrome with diarrhea    • Polyuria        Past Surgical History:   Past Surgical History:   Procedure Laterality Date   • CHOLECYSTECTOMY     • HYSTERECTOMY         Family History:   Family History   Problem Relation Age of Onset   • Cancer Brother    • Breast cancer Neg Hx        Social History:   Social History     Socioeconomic History   • Marital status:    Tobacco Use   • Smoking status: Some Days     Current packs/day: 1.00     Average packs/day: 1 pack/day for 36.4 years (36.4 ttl pk-yrs)     Types: Cigarettes     Start date: 1988   • Smokeless tobacco: Never   Vaping Use   • Vaping status: Never Used   Substance and Sexual Activity   • Alcohol use: Never   • Drug use: Yes     Types: Marijuana   • Sexual activity: Defer       Medications:   Current Outpatient Medications:   •  cyclobenzaprine (FLEXERIL) 10 MG tablet, Take 1 tablet by mouth 3 (Three) Times a Day As Needed for Muscle Spasms., Disp: 20 tablet, Rfl: 0  •  rosuvastatin (CRESTOR) 5 MG tablet, Take 1 tablet by mouth Daily., Disp: 30 tablet, Rfl: 11    Allergies:   Allergies   Allergen Reactions   • Lipitor [Atorvastatin] Myalgia       I reviewed the patient's chief complaint, history of present illness, review of systems, past medical history, surgical history, family history, social history, medications and allergy list.     Objective    Vital Signs:   Vitals:    06/11/24 1316   BP: 158/84   Weight: 72.6 kg (160 lb)   Height: 156.2 cm (61.5\")     Body " mass index is 29.74 kg/m².   BMI is >= 25 and <30. (Overweight) The following options were offered after discussion;: exercise counseling/recommendations and nutrition counseling/recommendations      In this visit the patient was advised to stop smoking and was offered tobacco cessation measures and resources, including NRT and/or medication intervention. At least 5 minutes was spent on face-to-face counseling regarding smoking cessation.     Ortho Exam:  left knee exam:   knee contour shows mild swelling present.   Active range of motion 0° to 120°.   Passive range of motion 0° to 130°.   Negative varus instability.   Pain with Valgus stress   Negative anterior or posterior laxity.   Negative Lachman exam.   positive tenderness to palpation at the medial joint line.   Negative tenderness at the lateral joint line.   tenderness over plica band at the medial knee.   Negative patella instability or crepitus.   Sensation grossly intact to the lower extremity and toes.   Negative Gavin's test.  right knee exam:   Normal knee contour without evidence of swelling or ecchymosis.   Normal range of motion.   Palpable medial plica band but without tenderness.  normal gait.   no assistive device   No limp.   Normal body habitus.  Awake alert and oriented ×3 no acute distress.   calf is supple and nontender negative Markie.   Dorsalis pedis 2+ bilaterally.   Sensation intact to light touch.    Results Review:   Imaging Results (Last 24 Hours)       Procedure Component Value Units Date/Time    XR Knee 4+ View Left [[id number]] Resulted: 06/11/24 1251     Updated: 06/11/24 1300        I personally reviewed and interpreted radiographs of the left knee from 6/11/2024.  No acute fracture or dislocation.  Mild medial joint space narrowing.    Procedures    Assessment / Plan    Assessment/Plan:   Diagnoses and all orders for this visit:    1. Left knee pain, unspecified chronicity (Primary)  -     XR Knee 4+ View Left    2. Sprain of  medial collateral ligament of left knee, initial encounter  -     Ambulatory Referral to Physical Therapy for Evaluation & Treatment    3. Accidental fall, initial encounter    4. Closed traumatic nondisplaced fracture of condyle of left femur, initial encounter  -     Ambulatory Referral to Physical Therapy for Evaluation & Treatment    Acute knee injury on 5/9/2024 where she was hit in the lateral knee by a running dog.  This resulted in a traumatic dislocation of the patella.  It was successfully reduced.  She has significant pain over the anterior medial knee.  Radiographs of her left knee show minimal medial degenerative changes.  Her MRI shows a lateral femoral condyle impacted fracture and sprain of the MCL.  We reviewed these findings in detail.  She does have significant laxity in the left knee with testing of the MCL.  Today, recommend a Dry-Rajinder hinged knee brace to help with that laxity.  Also get her into physical therapy for additional treatments.  She can take Tylenol and ibuprofen to control pain.  Encouraged her to work on her range of motion and avoid stiffness in the knee.  Apply ice to help with swelling.  She can also use heat as needed.  Follow-up in 6 weeks.    Previous documentation review: 5/15/2024-office visit-[facility].    Previous imaging studies reviewed: 6/11/2024-radiographs of the left knee.  6/4/2024-MRI left knee.  Follow Up:   Return in about 6 weeks (around 7/23/2024) for Recheck.      Fei Olivares MD  [facility] [place] [facility]

## 2024-06-11 NOTE — TELEPHONE ENCOUNTER
Called and spoke to patient. Let her know that Dr Olivares only goes to [place] on Tuesdays but if she wanted to she could drive to [place] and we have a brace fitter carter that could adjust the brace. Patient said she adjusted the brace and walked to the mailbox and it feels a little better. She tightened the straps and the knee feels better but it is too tight around the thigh. She would rather wait and adjust a couple days to see if she can find a comfortable spot and if she cannot then she will come to [place] to get the brace adjusted. She verbalized understanding and was appreciative of care.

## 2024-07-22 ENCOUNTER — TELEPHONE (OUTPATIENT)
Dept: ORTHOPEDIC SURGERY | Facility: CLINIC | Age: 59
End: 2024-07-22
Payer: COMMERCIAL

## 2024-07-22 NOTE — TELEPHONE ENCOUNTER
Need to reschedule her 7/23/2024 appointment. Dr Olivares will be out of the office.   LM to call back to be scheduled.    HUB ok to reschedule.

## 2024-07-26 ENCOUNTER — OFFICE VISIT (OUTPATIENT)
Dept: CARDIOLOGY | Facility: CLINIC | Age: 59
End: 2024-07-26
Payer: COMMERCIAL

## 2024-07-26 VITALS
SYSTOLIC BLOOD PRESSURE: 140 MMHG | BODY MASS INDEX: 30.27 KG/M2 | HEART RATE: 82 BPM | WEIGHT: 164.5 LBS | HEIGHT: 62 IN | DIASTOLIC BLOOD PRESSURE: 86 MMHG | OXYGEN SATURATION: 97 % | RESPIRATION RATE: 18 BRPM

## 2024-07-26 DIAGNOSIS — Z72.0 SMOKING TRYING TO QUIT: ICD-10-CM

## 2024-07-26 DIAGNOSIS — R06.02 SHORTNESS OF BREATH: ICD-10-CM

## 2024-07-26 DIAGNOSIS — I25.10 CORONARY ARTERY CALCIFICATION SEEN ON CT SCAN: Primary | ICD-10-CM

## 2024-07-26 DIAGNOSIS — E78.00 PURE HYPERCHOLESTEROLEMIA: ICD-10-CM

## 2024-07-26 PROCEDURE — 99214 OFFICE O/P EST MOD 30 MIN: CPT | Performed by: INTERNAL MEDICINE

## 2024-07-26 RX ORDER — HYDROCHLOROTHIAZIDE 12.5 MG/1
12.5 TABLET ORAL DAILY
Qty: 90 TABLET | Refills: 3 | Status: SHIPPED | OUTPATIENT
Start: 2024-07-26

## 2024-07-26 NOTE — PROGRESS NOTES
"[facility]  [facility] CARDIOLOGY  [address] [place] [address]  Dept: [phone]  Dept Fax: [phone]    Date: 07/26/2024  Patient: Kalli Lemons  YOB: 1965    Follow Up Office Visit Note    Interval Follow-up  Ms. Kalli Lemons is a 59 y.o. female who is here for follow-up on coronary artery calcification seen on CT scan.    Subjective   Patient doing well today with no complaints.  Recent incident with the dogs at home, while running and playing knocked her down, seen prior rib fracture, left lower limb fracture, MCA tear.  Patient recovered since but still has a brace and unsteady gait.  Patient denies angina, orthopnea, PND, palpitations, lightheadedness, syncope or medications side-effects.    The following portions of the patient's history were reviewed and updated as appropriate: allergies, current medications, past family history, past medical history, past social history, past surgical history, and problem list.    Medications:   Allergies   Allergen Reactions    Lipitor [Atorvastatin] Myalgia      Current Outpatient Medications   Medication Instructions    cyclobenzaprine (FLEXERIL) 10 mg, Oral, 3 Times Daily PRN    hydroCHLOROthiazide 12.5 mg, Oral, Daily    rosuvastatin (CRESTOR) 5 mg, Oral, Daily       Tobacco Use: High Risk (7/26/2024)    Patient History     Smoking Tobacco Use: Some Days     Smokeless Tobacco Use: Never     Passive Exposure: Not on file        Objective  Vitals:    07/26/24 1026   BP: 140/86   BP Location: Right arm   Patient Position: Sitting   Pulse: 82   Resp: 18   SpO2: 97%   Weight: 74.6 kg (164 lb 8 oz)   Height: 156.2 cm (61.5\")   PainSc: 0-No pain      Vitals:    07/26/24 1026   BP: 140/86   BP Location: Right arm   Patient Position: Sitting   Pulse: 82   Resp: 18   SpO2: 97%   Weight: 74.6 kg (164 lb 8 oz)   Height: 156.2 cm (61.5\")          Physical Exam  Constitutional:       Appearance: Not in distress. " "  Neck:      Vascular: JVD normal.   Pulmonary:      Breath sounds: Normal breath sounds.   Cardiovascular:      Normal rate. Regular rhythm.      Murmurs: There is no murmur.   Pulses:     Intact distal pulses.   Edema:     Peripheral edema absent.   Neurological:      Mental Status: Alert.              Diagnostic Data  Lab Results   Component Value Date     07/07/2023    K 4.7 07/07/2023     07/07/2023    CO2 22 07/07/2023    MG 2.1 07/07/2023    BUN 12 07/07/2023    CREATININE 0.62 07/07/2023    CALCIUM 9.3 07/07/2023    BILITOT 0.4 07/07/2023    ALKPHOS 81 07/07/2023    ALT 11 07/07/2023    AST 13 07/07/2023    GLUCOSE 96 07/07/2023    ALBUMIN 4.4 07/07/2023     Lab Results   Component Value Date    WBC 12.1 (H) 07/07/2023    HGB 14.8 07/07/2023    HCT 44.5 07/07/2023     07/07/2023     No results found for: \"APTT\", \"INR\", \"PTT\"  No results found for: \"CKTOTAL\", \"TROPONINI\", \"TROPONINT\", \"CKMBINDEX\", \"BNP\"  No results found for: \"BNP\", \"PROBNP\"    Lab Results   Component Value Date    CHLPL 150 01/31/2024    TRIG 116 01/31/2024    HDL 55 01/31/2024    LDL 74 01/31/2024     Lab Results   Component Value Date    TSH 1.460 07/07/2023       CV Diagnostics:  Procedures    CXR: No results found for this or any previous visit.     ECHO/MUGA: Results for orders placed in visit on 04/13/21    Adult Transthoracic Echo Complete W/ Cont if Necessary Per Protocol    Interpretation Summary  · Estimated left ventricular EF was in agreement with the calculated left ventricular EF. Left ventricular ejection fraction appears to be 61 - 65%. Left ventricular systolic function is normal.  · Left ventricular diastolic function was normal.  · Estimated right ventricular systolic pressure from tricuspid regurgitation is normal (<35 mmHg).  · Normal valves  · No pericardial effussion     STRESS TESTS: Results for orders placed in visit on 12/19/23    Stress Test With Myocardial Perfusion One Day    Interpretation " Summary    Impressions are consistent with a low risk treadmill nuclear stress test - normal study.    Myocardial perfusion imaging indicates a normal stress myocardial perfusion with no evidence of ischemia.    Left ventricular ejection fraction is hyperdynamic (Calculated EF > 70%).    Moderate risk for ischemic heart disease.    Electrocardiographically, findings consistent with an indeterminate ECG stress test with a Duke treadmill score of +4 (medium risk). No significant ST-T changes during stress with return to baseline after 2 to 5 minutes in recovery.    Breast attenuation artifact is present.     CARDIAC CATH: No results found for this or any previous visit.     DEVICES: No valid procedures specified.   HOLTER: No results found for this or any previous visit.     CT/MRI:  No results found for this or any previous visit.    VASCULAR: No valid procedures specified.     Assessment and Plan  Diagnoses and all orders for this visit:    1. Coronary artery calcification seen on CT scan (Primary)  Assessment & Plan:  Suggestive of CAD.  No angina.  Patient with hypercholesterolemia.  Treadmill nuclear stress test negative, nonetheless moderate risk by Rojas's criteria.  Blood pressure not to goal plan:  Continue Crestor 5 mg appears to be well-tolerated and lipid profile to goal  Add hydrochlorothiazide 12.5 mg p.o. daily, to hold if blood pressure less than 110 systolic, for blood pressure control      2. Shortness of breath  Assessment & Plan:  At baseline.  Patient is a smoker.  Complaining of exertional dyspnea, in the setting of risk factors (coronary calcifications, hypercholesterolemia).  Patient had normal stress test and a normal echo 2 years and half ago.  Repeat treadmill nuclear stress test negative as well.  Blood pressure not to target, at home in the 130s systolic, never in the 120s.  Smoking also picked up to 1 pack/day after the fall, now back to half a pack per day.  Plan:  Continue Crestor 5 mg 1  tab daily for now since well-tolerated and lipid profile to goal  Smoking cessation reinforced   Encouraged T-minute exercise daily, weight loss, heart healthy low-salt low-cholesterol diet.      3. Pure hypercholesterolemia  Assessment & Plan:  Lipid abnormalities are improving with treatment.  Nutritional counseling was provided. and Pharmacotherapy as ordered.  Patient counseled in regards to heart healthy, low fat/ low cholesterol/low sat diet, daily exercise for 30 minutes, low to moderate intensity, and weight loss.  Lipids will be reassessed in 1 year.        4. Smoking trying to quit  Assessment & Plan:  Worsening of her tobacco use during the events when she was in bedrest from .  Now back to half pack per day.  Encourage cessation.      Other orders  -     hydroCHLOROthiazide 12.5 MG tablet; Take 1 tablet by mouth Daily.  Dispense: 90 tablet; Refill: 3         Return in about 6 months (around 2025) for Follow-up with Dr He.    Patient Instructions   [facility]  [facility] CARDIOLOGY  [address] [place] [address]  Dept: [phone]  Dept Fax: [phone]    Date:   Patient: Kalli Lemons    Blood Pressure Log  Provided By: Parveen He MD    Date Blood Pressure Heart Rate Comments   Saturday 2024       Moisés 2024       Monday 2024       Tuesday 2024       Wednesday 2024       Thursday 2024       Friday 2024       Saturday August 3, 2024       Moisés 2024       Monday 2024       Tuesday 2024       Wednesday 2024       Thursday 2024       Friday 2024       Saturday August 10, 2024       Moisés 2024       Monday 2024       Tuesday 2024       Wednesday 2024       Thursday August 15, 2024       Friday 2024       Saturday 2024       Moisés 2024        Monday August 19, 2024       Tuesday August 20, 2024       Wednesday August 21, 2024       Thursday August 22, 2024       Friday August 23, 2024       Saturday August 24, 2024       Moisés August 25, 2024       Monday August 26, 2024       Tuesday August 27, 2024       Wednesday August 28, 2024       Thursday August 29, 2024       Friday August 30, 2024       Saturday August 31, 2024       Moisés September 1, 2024       Monday September 2, 2024       Tuesday September 3, 2024       Wednesday September 4, 2024       Thursday September 5, 2024       Friday September 6, 2024       Saturday September 7, 2024       Moisés September 8, 2024       Monday September 9, 2024       Tuesday September 10, 2024       Wednesday September 11, 2024       Thursday September 12, 2024       Friday September 13, 2024       Saturday September 14, 2024       Moisés September 15, 2024       Monday September 16, 2024       Tuesday September 17, 2024       Wednesday September 18, 2024       Thursday September 19, 2024       Friday September 20, 2024       Saturday September 21, 2024       Moisés September 22, 2024       Monday September 23, 2024            Parveen He MD

## 2024-07-26 NOTE — PATIENT INSTRUCTIONS
[facility]  [facility] CARDIOLOGY  [address] [place] [address]  Dept: [phone]  Dept Fax: [phone]    Date:   Patient: Kalli Lemons    Blood Pressure Log  Provided By: Parveen He MD    Date Blood Pressure Heart Rate Comments   Saturday July 27, 2024       Moisés July 28, 2024       Monday July 29, 2024       Tuesday July 30, 2024       Wednesday July 31, 2024       Thursday August 1, 2024       Friday August 2, 2024       Saturday August 3, 2024       Moisés August 4, 2024       Monday August 5, 2024       Tuesday August 6, 2024       Wednesday August 7, 2024       Thursday August 8, 2024       Friday August 9, 2024       Saturday August 10, 2024       Moisés August 11, 2024       Monday August 12, 2024       Tuesday August 13, 2024       Wednesday August 14, 2024       Thursday August 15, 2024       Friday August 16, 2024       Saturday August 17, 2024       Moisés August 18, 2024       Monday August 19, 2024       Tuesday August 20, 2024       Wednesday August 21, 2024       Thursday August 22, 2024       Friday August 23, 2024       Saturday August 24, 2024       Moisés August 25, 2024       Monday August 26, 2024       Tuesday August 27, 2024       Wednesday August 28, 2024       Thursday August 29, 2024       Friday August 30, 2024       Saturday August 31, 2024       Moisés September 1, 2024       Monday September 2, 2024       Tuesday September 3, 2024       Wednesday September 4, 2024       Thursday September 5, 2024       Friday September 6, 2024       Saturday September 7, 2024       Moisés September 8, 2024       Monday September 9, 2024       Tuesday September 10, 2024       Wednesday September 11, 2024       Thursday September 12, 2024       Friday September 13, 2024       Saturday September 14, 2024       Moisés September 15, 2024       Monday September 16, 2024       Tuesday September 17, 2024       Wednesday September 18, 2024       Thursday  September 19, 2024 Friday September 20, 2024 Saturday September 21, 2024 Sunday September 22, 2024 Monday September 23, 2024

## 2024-07-26 NOTE — ASSESSMENT & PLAN NOTE
Worsening of her tobacco use during the events when she was in bedrest from fall.  Now back to half pack per day.  Encourage cessation.

## 2024-07-26 NOTE — ASSESSMENT & PLAN NOTE
At baseline.  Patient is a smoker.  Complaining of exertional dyspnea, in the setting of risk factors (coronary calcifications, hypercholesterolemia).  Patient had normal stress test and a normal echo 2 years and half ago.  Repeat treadmill nuclear stress test negative as well.  Blood pressure not to target, at home in the 130s systolic, never in the 120s.  Smoking also picked up to 1 pack/day after the fall, now back to half a pack per day.  Plan:  Continue Crestor 5 mg 1 tab daily for now since well-tolerated and lipid profile to goal  Smoking cessation reinforced   Encouraged T-minute exercise daily, weight loss, heart healthy low-salt low-cholesterol diet.

## 2024-07-26 NOTE — ASSESSMENT & PLAN NOTE
Lipid abnormalities are improving with treatment.  Nutritional counseling was provided. and Pharmacotherapy as ordered.  Patient counseled in regards to heart healthy, low fat/ low cholesterol/low sat diet, daily exercise for 30 minutes, low to moderate intensity, and weight loss.  Lipids will be reassessed in 1 year.

## 2024-07-26 NOTE — ASSESSMENT & PLAN NOTE
Suggestive of CAD.  No angina.  Patient with hypercholesterolemia.  Treadmill nuclear stress test negative, nonetheless moderate risk by Rojas's criteria.  Blood pressure not to goal plan:  Continue Crestor 5 mg appears to be well-tolerated and lipid profile to goal  Add hydrochlorothiazide 12.5 mg p.o. daily, to hold if blood pressure less than 110 systolic, for blood pressure control

## 2024-08-20 ENCOUNTER — OFFICE VISIT (OUTPATIENT)
Dept: ORTHOPEDIC SURGERY | Facility: CLINIC | Age: 59
End: 2024-08-20
Payer: COMMERCIAL

## 2024-08-20 VITALS
SYSTOLIC BLOOD PRESSURE: 154 MMHG | HEIGHT: 61 IN | WEIGHT: 164.46 LBS | DIASTOLIC BLOOD PRESSURE: 92 MMHG | BODY MASS INDEX: 31.05 KG/M2

## 2024-08-20 DIAGNOSIS — S72.415D CLOSED TRAUMATIC NONDISPLACED FRACTURE OF CONDYLE OF LEFT FEMUR WITH ROUTINE HEALING, SUBSEQUENT ENCOUNTER: ICD-10-CM

## 2024-08-20 DIAGNOSIS — S83.412D SPRAIN OF MEDIAL COLLATERAL LIGAMENT OF LEFT KNEE, SUBSEQUENT ENCOUNTER: Primary | ICD-10-CM

## 2024-08-20 PROCEDURE — 99213 OFFICE O/P EST LOW 20 MIN: CPT | Performed by: STUDENT IN AN ORGANIZED HEALTH CARE EDUCATION/TRAINING PROGRAM

## 2024-08-20 NOTE — PROGRESS NOTES
[facility] Follow Up Visit     Office Follow Up      Date: 08/20/2024   Patient Name: Kalli Lemons  MRN: [id number]  YOB: 1965    Referring Physician: No ref. provider found     Chief Complaint:   Chief Complaint   Patient presents with    Follow-up     10 week f/u; Left knee pain     History of Present Illness: Kalli Lemons is a 59 y.o. female who returns to clinic today for follow up on left knee pain. Her pain is a 2 /10 on the pain scale. Patient has tried the following previous treatments bracing , anti-inflammatories, and physical therapy . She mentions current symptoms of  aching pain . She states that these treatments have Improved.    Subjective     Review of Systems: Review of Systems   Constitutional:  Negative for chills, fever, unexpected weight gain and unexpected weight loss.   HENT:  Negative for congestion, postnasal drip and rhinorrhea.    Eyes:  Negative for blurred vision.   Respiratory:  Negative for shortness of breath.    Cardiovascular:  Negative for leg swelling.   Gastrointestinal:  Negative for abdominal pain, nausea and vomiting.   Genitourinary:  Negative for difficulty urinating.   Musculoskeletal:  Positive for arthralgias. Negative for gait problem, joint swelling and myalgias.   Skin:  Negative for skin lesions and wound.   Neurological:  Negative for dizziness, weakness, light-headedness and numbness.   Hematological:  Does not bruise/bleed easily.   Psychiatric/Behavioral:  Negative for depressed mood.         Medications:   Current Outpatient Medications:     cyclobenzaprine (FLEXERIL) 10 MG tablet, Take 1 tablet by mouth 3 (Three) Times a Day As Needed for Muscle Spasms., Disp: 20 tablet, Rfl: 0    hydroCHLOROthiazide 12.5 MG tablet, Take 1 tablet by mouth Daily., Disp: 90 tablet, Rfl: 3    rosuvastatin (CRESTOR) 5 MG tablet, Take 1 tablet by mouth Daily., Disp: 30 tablet, Rfl: 11    Allergies:  "  Allergies   Allergen Reactions    Lipitor [Atorvastatin] Myalgia       I have reviewed and updated the patient's chief complaint, history of present illness, review of systems, past medical history, surgical history, family history, social history, medications and allergy list as appropriate.     Objective      Vital Signs:   Vitals:    08/20/24 0957   BP: 154/92   Weight: 74.6 kg (164 lb 7.4 oz)   Height: 156.2 cm (61.5\")     Body mass index is 30.58 kg/m².  BMI is >= 30 and <35. (Class 1 Obesity). The following options were offered after discussion;: exercise counseling/recommendations and nutrition counseling/recommendations     In this visit the patient was advised to stop smoking and was offered tobacco cessation measures and resources, including NRT and/or medication intervention. At least 3 minutes was spent on face-to-face counseling regarding smoking cessation.      Ortho Exam:  left knee exam:   knee contour shows mild swelling present.   Active range of motion 0° to 120°.   Negative varus instability.   Minimal laxity with Valgus stress   Negative anterior or posterior laxity.   Negative Lachman exam.   - tenderness to palpation at the medial joint line.   Minimal tenderness at the lateral joint line.   tenderness over plica band at the medial knee.   Negative patella instability or crepitus.   Sensation grossly intact to the lower extremity and toes.   Negative Gavin's test.    Results Review:   Imaging Results (Last 24 Hours)       ** No results found for the last 24 hours. **            Procedures    Assessment / Plan      Assessment/Plan:   Diagnoses and all orders for this visit:    1. Sprain of medial collateral ligament of left knee, subsequent encounter (Primary)    2. Closed traumatic nondisplaced fracture of condyle of left femur with routine healing, subsequent encounter    Follow-up on left knee injury resulting in MCL sprain and lateral femoral condyle impaction fracture.  Much improved " since last visit.  Minimally tender at the lateral femoral condyle and only mild laxity experience with valgus stress testing.  She has been wearing her [employer]'s hinged knee brace.  This is provided good support and comfort.  She has good range of motion today.  Has been quite active especially outside in her yard.  Recommend she continue with Tylenol and ibuprofen in the brace especially outside the house and on uneven surfaces.  I will see her back in 2 months to make sure that she is continuing to improve.  Believe she is about 90% to full health.    Follow Up:   Return in about 2 months (around 10/20/2024) for Recheck.      Fei Olivares MD  [facility]

## 2024-10-10 ENCOUNTER — TELEPHONE (OUTPATIENT)
Dept: FAMILY MEDICINE CLINIC | Facility: CLINIC | Age: 59
End: 2024-10-10
Payer: COMMERCIAL

## 2024-10-10 NOTE — TELEPHONE ENCOUNTER
Name: Kalli Lemons    Relationship: Self    Best Callback Number: [phone]     HUB PROVIDED THE RELAY MESSAGE FROM THE OFFICE   PATIENT VOICED UNDERSTANDING AND HAS NO FURTHER QUESTIONS AT THIS TIME    ADDITIONAL INFORMATION: RELAYED    YES PATIENT HAD IT DONE AT [facility] JUNE 4TH 2024    COMPOUND FRACTURE AND TORN MCL ON LEFT KNEE     PATIENT WENT TO PHYSICAL THERAPY AND HER ORTHOPEDIC SURGEON TOLD HER ITS ABOUT 85% HEALED AROUND 08/20/2024 (DR FARFAN)

## 2024-10-21 ENCOUNTER — TELEPHONE (OUTPATIENT)
Dept: FAMILY MEDICINE CLINIC | Facility: CLINIC | Age: 59
End: 2024-10-21
Payer: COMMERCIAL

## 2024-11-22 ENCOUNTER — TELEPHONE (OUTPATIENT)
Dept: CARDIOLOGY | Facility: CLINIC | Age: 59
End: 2024-11-22
Payer: COMMERCIAL

## 2024-11-22 DIAGNOSIS — E78.00 PURE HYPERCHOLESTEROLEMIA: ICD-10-CM

## 2024-11-22 RX ORDER — ROSUVASTATIN CALCIUM 5 MG/1
5 TABLET, COATED ORAL DAILY
Qty: 30 TABLET | Refills: 11 | Status: SHIPPED | OUTPATIENT
Start: 2024-11-22

## 2024-12-12 ENCOUNTER — TELEPHONE (OUTPATIENT)
Dept: FAMILY MEDICINE CLINIC | Facility: CLINIC | Age: 59
End: 2024-12-12
Payer: COMMERCIAL

## 2024-12-12 DIAGNOSIS — Z12.2 SCREENING FOR LUNG CANCER: Primary | ICD-10-CM

## 2024-12-12 NOTE — TELEPHONE ENCOUNTER
Caller: Kalli Lemons    Relationship: Self    Best call back number:     [phone]       What orders are you requesting (i.e. lab or imaging): LUNG CANCER SCREENING     In what timeframe would the patient need to come in: AS SOON AS POSSIBLE     Where will you receive your lab/imaging services: WHERE EVER ORDER IS SENT    Additional notes:

## 2024-12-17 ENCOUNTER — OFFICE VISIT (OUTPATIENT)
Dept: ORTHOPEDIC SURGERY | Facility: CLINIC | Age: 59
End: 2024-12-17
Payer: COMMERCIAL

## 2024-12-17 VITALS
BODY MASS INDEX: 30.18 KG/M2 | HEIGHT: 62 IN | DIASTOLIC BLOOD PRESSURE: 86 MMHG | WEIGHT: 164 LBS | SYSTOLIC BLOOD PRESSURE: 142 MMHG

## 2024-12-17 DIAGNOSIS — M17.12 PRIMARY OSTEOARTHRITIS OF LEFT KNEE: ICD-10-CM

## 2024-12-17 DIAGNOSIS — S72.415D CLOSED TRAUMATIC NONDISPLACED FRACTURE OF CONDYLE OF LEFT FEMUR WITH ROUTINE HEALING, SUBSEQUENT ENCOUNTER: Primary | ICD-10-CM

## 2024-12-17 RX ORDER — MELOXICAM 15 MG/1
15 TABLET ORAL DAILY
Qty: 90 TABLET | Refills: 1 | Status: SHIPPED | OUTPATIENT
Start: 2024-12-17

## 2024-12-17 NOTE — PROGRESS NOTES
[facility] Follow Up Visit     Office Follow Up      Date: 12/17/2024   Patient Name: Kalli Lemons  MRN: [id number]  YOB: 1965    Referring Physician: No ref. provider found     Chief Complaint:   Chief Complaint   Patient presents with    Follow-up     4 month follow up -- Sprain of medial collateral ligament of left knee     History of Present Illness: Kalli Lemons is a 59 y.o. female who returns to clinic today for follow up on left knee pain. Her pain is a 6 /10 on the pain scale. Patient has tried the following previous treatments bracing , anti-inflammatories, and physical therapy . She mentions current symptoms of pain , popping, stiffness, and numbness / tingling. She states that these treatments have Improved.    Subjective     Review of Systems: Review of Systems   Constitutional:  Negative for chills, fever, unexpected weight gain and unexpected weight loss.   HENT:  Negative for congestion, postnasal drip and rhinorrhea.    Eyes:  Negative for blurred vision.   Respiratory:  Negative for shortness of breath.    Cardiovascular:  Negative for leg swelling.   Gastrointestinal:  Negative for abdominal pain, nausea and vomiting.   Genitourinary:  Negative for difficulty urinating.   Musculoskeletal:  Positive for arthralgias. Negative for gait problem, joint swelling and myalgias.   Skin:  Negative for skin lesions and wound.   Neurological:  Negative for dizziness, weakness, light-headedness and numbness.   Hematological:  Does not bruise/bleed easily.   Psychiatric/Behavioral:  Negative for depressed mood.         Medications:   Current Outpatient Medications:     hydroCHLOROthiazide 12.5 MG tablet, Take 1 tablet by mouth Daily., Disp: 90 tablet, Rfl: 3    rosuvastatin (CRESTOR) 5 MG tablet, Take 1 tablet by mouth Daily., Disp: 30 tablet, Rfl: 11    meloxicam (MOBIC) 15 MG tablet, Take 1 tablet by mouth Daily., Disp: 90  "tablet, Rfl: 1    Allergies:   Allergies   Allergen Reactions    Lipitor [Atorvastatin] Myalgia       I have reviewed and updated the patient's chief complaint, history of present illness, review of systems, past medical history, surgical history, family history, social history, medications and allergy list as appropriate.     Objective      Vital Signs:   Vitals:    12/17/24 0920   BP: 142/86   Weight: 74.4 kg (164 lb)   Height: 156.2 cm (61.5\")     Body mass index is 30.49 kg/m².  BMI is >= 30 and <35. (Class 1 Obesity). The following options were offered after discussion;: exercise counseling/recommendations and nutrition counseling/recommendations     In this visit the patient was advised to stop smoking and was offered tobacco cessation measures and resources, including NRT and/or medication intervention. At least 3 minutes was spent on face-to-face counseling regarding smoking cessation.      Ortho Exam:  Left knee exam:   There is swelling and effusion but no warmth or erythema of the knee.    The skin is clear.    Overall the alignment of the knee is varus   The patient has 5/5 strength with ankle plantar flexion, dorsiflexion, inversion, eversion, and great toe extension.    Sensation is grossly present to light touch in the superficial peroneal, deep peroneal, and tibial nerve distributions.    The dorsalis pedis pulse is 2+ and the foot is well perfused with brisk capillary refill.   Active ROM is 0° to 110°.   Passive ROM is 0° to 110°.   The knee shows no gross instability to varus/valgus stress testing, anterior/posterior drawer sign, and Lachman testing.    There is tenderness to palpation over the medial/lateral joint line. Patellar grind test is positive.   Hip ROM is full and painless.    Results Review:   Imaging Results (Last 24 Hours)       ** No results found for the last 24 hours. **            Procedures    Assessment / Plan      Assessment/Plan:   Diagnoses and all orders for this " visit:    1. Closed traumatic nondisplaced fracture of condyle of left femur with routine healing, subsequent encounter (Primary)    2. Primary osteoarthritis of left knee  -     meloxicam (MOBIC) 15 MG tablet; Take 1 tablet by mouth Daily.  Dispense: 90 tablet; Refill: 1    Follow-up on left knee pain.  We have previously treated her for nondisplaced lateral femoral condyle fracture.  4 months since the last visit for that.  Symptoms greatly improved in the lateral knee.  However, she has anterior medial knee pain today.  She has been more physically active taking care of her grandchildren.  This is required a lot of lifting.  She appears to have flared up her left knee osteoarthritis.  I recommend that we start her on prescription anti-inflammatory medication with meloxicam.  She should go back into her drawtex hinged knee brace to give her knee some stability especially when doing the lifting the is required of her at this current time.  If symptoms not improved, we can consider a corticosteroid injection into the knee to control her symptoms but hold off today.  Follow-up as needed.    Follow Up:   Return if symptoms worsen or fail to improve.      Fei Olivares MD  [facility]

## 2024-12-23 ENCOUNTER — TELEPHONE (OUTPATIENT)
Dept: FAMILY MEDICINE CLINIC | Facility: CLINIC | Age: 59
End: 2024-12-23

## 2024-12-23 ENCOUNTER — TRANSCRIBE ORDERS (OUTPATIENT)
Dept: MAMMOGRAPHY | Facility: CLINIC | Age: 59
End: 2024-12-23
Payer: COMMERCIAL

## 2024-12-23 DIAGNOSIS — Z12.31 ENCOUNTER FOR SCREENING MAMMOGRAM FOR MALIGNANT NEOPLASM OF BREAST: Primary | ICD-10-CM

## 2024-12-23 NOTE — TELEPHONE ENCOUNTER
Caller: Kalli Lemons    Relationship: Self    Best call back number: [phone]     What is the medical concern/diagnosis: MAMMOGRAM IS DUE    What specialty or service is being requested: MAMMOGRAM    What is the provider, practice or medical service name:   [facility]    What is the office location: [facility] [address], [place] [address]    What is the office phone number: [phone]

## 2024-12-28 NOTE — TELEPHONE ENCOUNTER
It looks like mammogram order was placed on 12/23/2024.  Please check with the scheduling team on the status of this and let patient know.  Thanks

## 2025-01-03 ENCOUNTER — TELEPHONE (OUTPATIENT)
Dept: FAMILY MEDICINE CLINIC | Facility: CLINIC | Age: 60
End: 2025-01-03
Payer: COMMERCIAL

## 2025-01-03 DIAGNOSIS — E27.9 ADRENAL NODULE: Primary | ICD-10-CM

## 2025-01-03 NOTE — TELEPHONE ENCOUNTER
Pt informed and would like the referral.    Pt also states that where she was ran over by dogs? She is still having issues with her left side. Her orthopedic only took care of the knee. Her leg is still giving her a lot of issues. She states she has an annual appt on 01/08/25 and wanted to know if this could be addressed at the same time?       ----- Message from Jacques Lord sent at 1/3/2025 12:48 PM EST -----  Please let patient know that her low-dose lung CT scan shows no unusual nodules.  It does note mild emphysema.  She needs to continue with annual low-dose lung CT scans.  They also note a nodule of the right adrenal gland which they state is likely an adenoma (non cancerous growth and usually of no concern) but I would recommend that we set her up with an endocrinologist just to evaluate this further to determine exactly what it is. Is she fine if I put referral in? Let me know. Thanks

## 2025-01-08 ENCOUNTER — TELEPHONE (OUTPATIENT)
Dept: FAMILY MEDICINE CLINIC | Facility: CLINIC | Age: 60
End: 2025-01-08

## 2025-01-08 ENCOUNTER — OFFICE VISIT (OUTPATIENT)
Dept: FAMILY MEDICINE CLINIC | Facility: CLINIC | Age: 60
End: 2025-01-08
Payer: COMMERCIAL

## 2025-01-08 VITALS
HEIGHT: 62 IN | DIASTOLIC BLOOD PRESSURE: 82 MMHG | BODY MASS INDEX: 30.8 KG/M2 | SYSTOLIC BLOOD PRESSURE: 130 MMHG | WEIGHT: 167.38 LBS | HEART RATE: 85 BPM | OXYGEN SATURATION: 96 %

## 2025-01-08 DIAGNOSIS — Z00.00 ANNUAL PHYSICAL EXAM: Primary | ICD-10-CM

## 2025-01-08 DIAGNOSIS — Z12.2 SCREENING FOR LUNG CANCER: ICD-10-CM

## 2025-01-08 DIAGNOSIS — E56.9 VITAMIN DEFICIENCY: ICD-10-CM

## 2025-01-08 DIAGNOSIS — I73.9 PVD (PERIPHERAL VASCULAR DISEASE) WITH CLAUDICATION: ICD-10-CM

## 2025-01-08 DIAGNOSIS — Z12.4 SCREENING FOR CERVICAL CANCER: ICD-10-CM

## 2025-01-08 DIAGNOSIS — I10 BENIGN ESSENTIAL HTN: ICD-10-CM

## 2025-01-08 DIAGNOSIS — M79.605 LEFT LEG PAIN: ICD-10-CM

## 2025-01-08 DIAGNOSIS — I25.10 CORONARY ARTERY CALCIFICATION SEEN ON CT SCAN: ICD-10-CM

## 2025-01-08 DIAGNOSIS — M54.50 LUMBAR PAIN: ICD-10-CM

## 2025-01-08 DIAGNOSIS — Z72.0 SMOKING TRYING TO QUIT: ICD-10-CM

## 2025-01-08 DIAGNOSIS — E78.00 PURE HYPERCHOLESTEROLEMIA: ICD-10-CM

## 2025-01-08 DIAGNOSIS — Z12.31 ENCOUNTER FOR SCREENING MAMMOGRAM FOR MALIGNANT NEOPLASM OF BREAST: ICD-10-CM

## 2025-01-08 DIAGNOSIS — R73.03 PREDIABETES: ICD-10-CM

## 2025-01-08 PROBLEM — R06.02 SHORTNESS OF BREATH: Status: RESOLVED | Noted: 2021-03-24 | Resolved: 2025-01-08

## 2025-01-08 PROBLEM — Z11.59 NEED FOR HEPATITIS C SCREENING TEST: Status: RESOLVED | Noted: 2023-07-07 | Resolved: 2025-01-08

## 2025-01-08 PROBLEM — R31.9 HEMATURIA: Status: RESOLVED | Noted: 2022-10-24 | Resolved: 2025-01-08

## 2025-01-08 PROBLEM — R10.30 LOWER ABDOMINAL PAIN: Status: RESOLVED | Noted: 2023-01-18 | Resolved: 2025-01-08

## 2025-01-08 PROBLEM — M54.2 NECK PAIN: Status: RESOLVED | Noted: 2024-05-15 | Resolved: 2025-01-08

## 2025-01-08 LAB
BILIRUB BLD-MCNC: NEGATIVE MG/DL
CLARITY, POC: CLEAR
COLOR UR: YELLOW
EXPIRATION DATE: NORMAL
GLUCOSE UR STRIP-MCNC: NEGATIVE MG/DL
KETONES UR QL: NEGATIVE
LEUKOCYTE EST, POC: NEGATIVE
Lab: NORMAL
NITRITE UR-MCNC: NEGATIVE MG/ML
PH UR: 6.5 [PH] (ref 5–8)
PROT UR STRIP-MCNC: NEGATIVE MG/DL
RBC # UR STRIP: NEGATIVE /UL
SP GR UR: 1.02 (ref 1–1.03)
UROBILINOGEN UR QL: NORMAL

## 2025-01-08 PROCEDURE — 99213 OFFICE O/P EST LOW 20 MIN: CPT | Performed by: NURSE PRACTITIONER

## 2025-01-08 PROCEDURE — 81003 URINALYSIS AUTO W/O SCOPE: CPT | Performed by: NURSE PRACTITIONER

## 2025-01-08 PROCEDURE — 99396 PREV VISIT EST AGE 40-64: CPT | Performed by: NURSE PRACTITIONER

## 2025-01-08 RX ORDER — HYDROCHLOROTHIAZIDE 12.5 MG/1
12.5 TABLET ORAL DAILY
COMMUNITY

## 2025-01-08 NOTE — TELEPHONE ENCOUNTER
Please find out for sure when patient is due a repeat colonoscopy and let me and patient know.  Thank

## 2025-01-08 NOTE — TELEPHONE ENCOUNTER
Pt had colonoscopy done 03/2023 and is due April 2026. Was performed at [facility] in [place]. Asked them to fax records over. Pt aware.

## 2025-01-09 ENCOUNTER — TELEPHONE (OUTPATIENT)
Dept: FAMILY MEDICINE CLINIC | Facility: CLINIC | Age: 60
End: 2025-01-09
Payer: COMMERCIAL

## 2025-01-09 LAB
25(OH)D3+25(OH)D2 SERPL-MCNC: 32.5 NG/ML (ref 30–100)
ALBUMIN SERPL-MCNC: 4.3 G/DL (ref 3.8–4.9)
ALP SERPL-CCNC: 73 IU/L (ref 44–121)
ALT SERPL-CCNC: 14 IU/L (ref 0–32)
AST SERPL-CCNC: 14 IU/L (ref 0–40)
BASOPHILS # BLD AUTO: 0.1 X10E3/UL (ref 0–0.2)
BASOPHILS NFR BLD AUTO: 1 %
BILIRUB SERPL-MCNC: 0.3 MG/DL (ref 0–1.2)
BUN SERPL-MCNC: 14 MG/DL (ref 8–27)
BUN/CREAT SERPL: 18 (ref 12–28)
CALCIUM SERPL-MCNC: 9.7 MG/DL (ref 8.7–10.3)
CHLORIDE SERPL-SCNC: 104 MMOL/L (ref 96–106)
CHOLEST SERPL-MCNC: 228 MG/DL (ref 100–199)
CO2 SERPL-SCNC: 25 MMOL/L (ref 20–29)
CREAT SERPL-MCNC: 0.79 MG/DL (ref 0.57–1)
EGFRCR SERPLBLD CKD-EPI 2021: 86 ML/MIN/1.73
EOSINOPHIL # BLD AUTO: 0.1 X10E3/UL (ref 0–0.4)
EOSINOPHIL NFR BLD AUTO: 1 %
ERYTHROCYTE [DISTWIDTH] IN BLOOD BY AUTOMATED COUNT: 12.7 % (ref 11.7–15.4)
GLOBULIN SER CALC-MCNC: 2.5 G/DL (ref 1.5–4.5)
GLUCOSE SERPL-MCNC: 97 MG/DL (ref 70–99)
HBA1C MFR BLD: 5.8 % (ref 4.8–5.6)
HCT VFR BLD AUTO: 44.9 % (ref 34–46.6)
HDLC SERPL-MCNC: 55 MG/DL
HGB BLD-MCNC: 14.9 G/DL (ref 11.1–15.9)
IMM GRANULOCYTES # BLD AUTO: 0.1 X10E3/UL (ref 0–0.1)
IMM GRANULOCYTES NFR BLD AUTO: 1 %
LDLC SERPL CALC-MCNC: 116 MG/DL (ref 0–99)
LYMPHOCYTES # BLD AUTO: 3.4 X10E3/UL (ref 0.7–3.1)
LYMPHOCYTES NFR BLD AUTO: 25 %
MCH RBC QN AUTO: 30.4 PG (ref 26.6–33)
MCHC RBC AUTO-ENTMCNC: 33.2 G/DL (ref 31.5–35.7)
MCV RBC AUTO: 92 FL (ref 79–97)
MONOCYTES # BLD AUTO: 0.8 X10E3/UL (ref 0.1–0.9)
MONOCYTES NFR BLD AUTO: 6 %
NEUTROPHILS # BLD AUTO: 8.9 X10E3/UL (ref 1.4–7)
NEUTROPHILS NFR BLD AUTO: 66 %
PLATELET # BLD AUTO: 327 X10E3/UL (ref 150–450)
POTASSIUM SERPL-SCNC: 4.8 MMOL/L (ref 3.5–5.2)
PROT SERPL-MCNC: 6.8 G/DL (ref 6–8.5)
RBC # BLD AUTO: 4.9 X10E6/UL (ref 3.77–5.28)
SODIUM SERPL-SCNC: 142 MMOL/L (ref 134–144)
TRIGL SERPL-MCNC: 328 MG/DL (ref 0–149)
TSH SERPL DL<=0.005 MIU/L-ACNC: 1.03 UIU/ML (ref 0.45–4.5)
VIT B12 SERPL-MCNC: 275 PG/ML (ref 232–1245)
VLDLC SERPL CALC-MCNC: 57 MG/DL (ref 5–40)
WBC # BLD AUTO: 13.3 X10E3/UL (ref 3.4–10.8)

## 2025-01-09 NOTE — TELEPHONE ENCOUNTER
Pt informed     ----- Message from Jacques Pop sent at 1/9/2025  8:21 AM EST -----  Please let patient know the radiologist states her thigh and lower leg x-rays were unremarkable.  They do note some mild arthritic changes of the hip.  Continue current treatment plan.  Thanks

## 2025-01-16 ENCOUNTER — TELEPHONE (OUTPATIENT)
Dept: FAMILY MEDICINE CLINIC | Facility: CLINIC | Age: 60
End: 2025-01-16
Payer: COMMERCIAL

## 2025-01-16 DIAGNOSIS — D72.829 LEUKOCYTOSIS, UNSPECIFIED TYPE: Primary | ICD-10-CM

## 2025-01-16 RX ORDER — BUPROPION HYDROCHLORIDE 150 MG/1
TABLET, EXTENDED RELEASE ORAL
Qty: 60 TABLET | Refills: 1 | Status: SHIPPED | OUTPATIENT
Start: 2025-01-16

## 2025-01-16 NOTE — TELEPHONE ENCOUNTER
LM on  to call back.    HUB to relay.       ----- Message from Jacques Lord sent at 1/15/2025  4:49 PM EST -----  Please let patient know from labs her white blood cell count has remained elevated.  Her neutrophils and lymphocytes which are types of white blood cells are also elevated.  Things such as inflammation or infection can cause these levels to be elevated but hers have been elevated in the past. Has she seen hematology (a blood specialist) about this before?  I would recommend that she follow-up with 1 to evaluate this further so let me know if she would like referral placed.    Total cholesterol triglycerides and LDL which is the bad cholesterol are all elevated.  Please make sure she has been taking her rosuvastatin.  Recommend she discuss these levels further with her cardiologist to determine if they want to make adjustments to her medication.  Encourage healthy low-fat diet 30 minutes exercise most days of the week.    A1c in the prediabetic range at 5.8.  Encourage healthy diet limiting intake of sugar sweets carbs starches and again exercise.    Vitamin B is on the lower end of normal.  Recommend she be taking 1 mg daily of an over-the-counter vitamin B supplement.  We can recheck this level in 3 months.  Thanks

## 2025-01-16 NOTE — TELEPHONE ENCOUNTER
Hematology referral placed.  Would she like to try Wellbutrin to see if that would help with the smoking?  Let me know.  Thanks

## 2025-01-16 NOTE — TELEPHONE ENCOUNTER
Name: Kalli Lemons    Relationship: Self    Best Callback Number: [phone]     HUB PROVIDED THE RELAY MESSAGE FROM THE OFFICE   PATIENT HAS FURTHER QUESTIONS AND WOULD LIKE A CALL BACK AT THE FOLLOWING PHONE NUMBER [phone]    ADDITIONAL INFORMATION:   PATIENT REPLIES SHE HAS NOT SEEN A HEMATOLOGIST BUT DID SEE A HEART SPECIALIST: DR GERARD STATES HE THINKS IT IS DUE TO HER SMOKING.  PATIENT WOULD LIKE TO FIND OUT IF THIS IS DUE TO HER SMOKING OR SOMETHING ELSE. SHE WOULD LIKE A REFERRAL TO HEMATOLOGY.    PATIENT HAS NOT BEEN TAKING HER ROSUVASTATIN. WAS TOLD SHE DID NOT NEED TO TAKE THE ROSUVASTATIN UNTIL THEY GET ELEVATED AGAIN. PATIENT STATES SHE WILL BEGIN TAKING AGAIN. PATIENT HAS SOME FROM LAST PRESCRIPTION. SHE HAS 3-4 BOTTLES THAT HAS NOT BEEN OPENED YET. SHE WILL TAKE THESE.     ASKING FOR THE VITAMIN B NUMBER DOSE SIZE, WHAT IS THE NUMBER TO FOLLOW? EXAMPLE B-6 ETC.    PATIENT HAS TROUBLE DOING CARDIO EXERCIZES DUE TO HER LEG. WILL TRY SOME CHAIR EXERCISES.    PATIENT WOULD LIKE A PRESCRIPTION TO HELP HER TO QUICK SMOKING. SHE NEEDS SOMETHING TO HELP HER CUT DOWN ON THE CRAVINGS.  PLEASE SEND TO [facility] [id number] [place], [place] - [address] - [phone]  - [phone]     PLEASE CALL TO ADVISE.

## 2025-01-17 NOTE — TELEPHONE ENCOUNTER
Name: Kalli Lemons      Relationship: Self      Best Callback Number: [phone]       HUB PROVIDED THE RELAY MESSAGE FROM THE OFFICE      PATIENT: VOICED UNDERSTANDING AND HAS NO FURTHER QUESTIONS AT THIS TIME    ADDITIONAL INFORMATION:

## 2025-02-10 ENCOUNTER — TELEPHONE (OUTPATIENT)
Dept: FAMILY MEDICINE CLINIC | Facility: CLINIC | Age: 60
End: 2025-02-10
Payer: COMMERCIAL

## 2025-02-10 NOTE — TELEPHONE ENCOUNTER
Caller: Kalli Lemons    Relationship: Self    Best call back number: [phone]           What was the call regarding:  PATIENT WOULD LIKE TO KNOW WHAT TYPE OF VITAMIN TO TAKE    Is it okay if the provider responds through MyChart:

## 2025-03-17 RX ORDER — BUPROPION HYDROCHLORIDE 150 MG/1
TABLET, EXTENDED RELEASE ORAL
Qty: 60 TABLET | Refills: 0 | Status: SHIPPED | OUTPATIENT
Start: 2025-03-17

## 2025-04-14 RX ORDER — BUPROPION HYDROCHLORIDE 150 MG/1
TABLET, EXTENDED RELEASE ORAL
Qty: 60 TABLET | Refills: 0 | Status: SHIPPED | OUTPATIENT
Start: 2025-04-14

## 2025-05-13 RX ORDER — BUPROPION HYDROCHLORIDE 150 MG/1
150 TABLET, EXTENDED RELEASE ORAL 2 TIMES DAILY
Qty: 180 TABLET | Refills: 1 | Status: SHIPPED | OUTPATIENT
Start: 2025-05-13

## 2025-05-30 NOTE — PROGRESS NOTES
Chief complaint/Reason for consult: Adrenal nodule    HPI: Ms. Lemons is a 60-year-old female with hypertension, prediabetes and hypercholesterolemia who comes for consultation of adrenal nodule.    #Adrenal nodule  -Incidentally found during CT chest done for lung cancer screening  -Image review shows that CT chest done 12/23/2024 has a hypodense right adrenal gland adenoma, images personally reviewed  -No biochemical screening tests have been done in the past   -Most recent HbA1c 5.8% done 1/8/2025  -Most recent lipid panel shows total cholesterol 228, triglycerides 328, HDL 55 and  done 1/8/2025  -Patient reports weight is fairly stable, fluctuates some with physical activity   -Patient denies large wide violaceous striae, proximal muscle weakness, easy bruising   -Chart review shows lowest potassium 4.7  -Blood pressure in the office today is 138/80, patient with known hypertension but not on any medications at this time   -Patient denies spells of high blood pressure, flushing, sweating, palpitations and tremor   -No prior history of problems with anesthesia   -No known active malignancies      # Prediabetes  - HbA1c 5.8% done 1/8/2025  - Reports eating a healthier diet recently, trying to avoid ultra processed foods and simple sugars/carbohydrates  - Does a lot of yard work in the warm weather most days     Past medical history, past surgical history, family history and social history reviewed within this encounter.     Review of Systems   Constitutional:  Negative for activity change and unexpected weight change.   HENT:  Negative for trouble swallowing.    Eyes:  Negative for visual disturbance.   Respiratory:  Negative for shortness of breath.    Cardiovascular:  Negative for chest pain.   Gastrointestinal:  Negative for abdominal pain.   Endocrine: Negative for cold intolerance and heat intolerance.   Musculoskeletal:  Negative for gait problem.   Skin:  Negative for rash.   Psychiatric/Behavioral:  " Positive for dysphoric mood. The patient is nervous/anxious.         /80 (BP Location: Left arm, Patient Position: Sitting, Cuff Size: Adult)   Pulse 85   Ht 156.2 cm (61.5\")   Wt 74.4 kg (164 lb)   SpO2 95%   BMI 30.49 kg/m²      Physical Exam  Vitals reviewed.   Constitutional:       General: She is not in acute distress.     Appearance: She is obese.   HENT:      Head: Normocephalic.      Nose: Nose normal.   Eyes:      Conjunctiva/sclera: Conjunctivae normal.   Cardiovascular:      Rate and Rhythm: Normal rate.   Pulmonary:      Effort: Pulmonary effort is normal.   Abdominal:      Tenderness: There is no guarding.   Musculoskeletal:         General: No deformity.   Skin:     General: Skin is warm and dry.   Neurological:      Mental Status: She is alert and oriented to person, place, and time.   Psychiatric:         Mood and Affect: Mood normal.        Labs and images reviewed as noted in the HPI    Assessment and plan:    Diagnoses and all orders for this visit:    1. Adrenal nodule (Primary)  Assessment & Plan:  -Screen for Cushing syndrome with DST, will plan to do it next week  -Screen for primary aldosteronism given her history of hypertension, check plasma renin and aldosterone today  -Screen for pheochromocytoma although I feel this is less likely given her absence of symptoms and the nodule characteristics on imaging, check plasma metanephrines today  -Recommend getting dedicated CT adrenal in the next couple of months to further characterize it    Orders:  -     Basic Metabolic Panel  -     Metanephrines, Frac. Free, Plasma  -     Aldosterone  -     Renin Direct Assay  -     Cortisol Dexamethasone Reflex  -     dexAMETHasone (DECADRON) 1 MG tablet; Take 1 tablet by mouth 1 (One) Time for 1 dose. At 10 pm the night before the dexamethasone suppression test  Dispense: 1 tablet; Refill: 0  -     CT Abdomen With & Without Contrast; Future    2. Prediabetes  Assessment & Plan:  -Can you being " active during the warmer summer days  -Continue to avoid ultra processed foods  -Check HbA1c today    Orders:  -     Hemoglobin A1c         Return in about 1 year (around 6/4/2026) for adrenal nodule .     Please note that portions of this note may have been completed with a voice recognition program. Efforts were made to edit the dictations, but occasionally words are mistranscribed.     Electronically signed by: Kyle S Rosenstein, MD

## 2025-06-04 ENCOUNTER — OFFICE VISIT (OUTPATIENT)
Dept: ENDOCRINOLOGY | Facility: CLINIC | Age: 60
End: 2025-06-04
Payer: COMMERCIAL

## 2025-06-04 VITALS
DIASTOLIC BLOOD PRESSURE: 80 MMHG | BODY MASS INDEX: 30.18 KG/M2 | HEART RATE: 85 BPM | SYSTOLIC BLOOD PRESSURE: 138 MMHG | WEIGHT: 164 LBS | OXYGEN SATURATION: 95 % | HEIGHT: 62 IN

## 2025-06-04 DIAGNOSIS — E27.9 ADRENAL NODULE: Primary | ICD-10-CM

## 2025-06-04 DIAGNOSIS — R73.03 PREDIABETES: ICD-10-CM

## 2025-06-04 PROCEDURE — 99204 OFFICE O/P NEW MOD 45 MIN: CPT | Performed by: HOSPITALIST

## 2025-06-04 RX ORDER — DEXAMETHASONE 1 MG
1 TABLET ORAL ONCE
Qty: 1 TABLET | Refills: 0 | Status: SHIPPED | OUTPATIENT
Start: 2025-06-04 | End: 2025-06-04

## 2025-06-04 NOTE — ASSESSMENT & PLAN NOTE
-Screen for Cushing syndrome with DST, will plan to do it next week  -Screen for primary aldosteronism given her history of hypertension, check plasma renin and aldosterone today  -Screen for pheochromocytoma although I feel this is less likely given her absence of symptoms and the nodule characteristics on imaging, check plasma metanephrines today  -Recommend getting dedicated CT adrenal in the next couple of months to further characterize it

## 2025-06-04 NOTE — ASSESSMENT & PLAN NOTE
-Can you being active during the warmer summer days  -Continue to avoid ultra processed foods  -Check HbA1c today

## 2025-06-09 LAB
ALDOST SERPL-MCNC: 15.5 NG/DL (ref 0–30)
BUN SERPL-MCNC: 14 MG/DL (ref 8–27)
BUN/CREAT SERPL: 17 (ref 12–28)
CALCIUM SERPL-MCNC: 9.7 MG/DL (ref 8.7–10.3)
CHLORIDE SERPL-SCNC: 104 MMOL/L (ref 96–106)
CO2 SERPL-SCNC: 21 MMOL/L (ref 20–29)
CREAT SERPL-MCNC: 0.81 MG/DL (ref 0.57–1)
EGFRCR SERPLBLD CKD-EPI 2021: 83 ML/MIN/1.73
GLUCOSE SERPL-MCNC: 81 MG/DL (ref 70–99)
HBA1C MFR BLD: 5.6 % (ref 4.8–5.6)
METANEPH FREE SERPL-MCNC: <25 PG/ML (ref 0–88)
NORMETANEPHRINE SERPL-MCNC: <25 PG/ML (ref 0–285.2)
POTASSIUM SERPL-SCNC: 4.5 MMOL/L (ref 3.5–5.2)
RENIN PLAS-CCNC: 1.02 NG/ML/HR (ref 0.17–5.38)
SODIUM SERPL-SCNC: 142 MMOL/L (ref 134–144)

## 2025-06-11 ENCOUNTER — TELEPHONE (OUTPATIENT)
Dept: FAMILY MEDICINE CLINIC | Facility: CLINIC | Age: 60
End: 2025-06-11

## 2025-06-11 NOTE — TELEPHONE ENCOUNTER
Caller: Kalli Lemons    Relationship: Self    Best call back number: [phone]     What is the best time to reach you: ANYTIME      What was the call regarding: PATIENT WAS SEEN BY AN ENDOCRINOLOGIST ON 06/04/25 AND SHE IS SUPPOSED TO HAVE A CT OF HER ABDOMIN. SHE DOES NOT WANT TO GO TO [facility], SHE WOULD LIKE TO HAVE CT DONE AT OUR LOCATION. PLEASE CALL AND ADVISE HER WHEN THE ORDER IS PLACED AND TO DISCUSS.

## 2025-06-19 LAB
CORTIS SERPL-MCNC: <1 UG/DL
DEXAMETHASONE REFLEX: NORMAL

## 2025-07-21 ENCOUNTER — TELEPHONE (OUTPATIENT)
Dept: CARDIOLOGY | Facility: CLINIC | Age: 60
End: 2025-07-21